# Patient Record
Sex: FEMALE | Race: BLACK OR AFRICAN AMERICAN | NOT HISPANIC OR LATINO
[De-identification: names, ages, dates, MRNs, and addresses within clinical notes are randomized per-mention and may not be internally consistent; named-entity substitution may affect disease eponyms.]

---

## 2018-11-14 ENCOUNTER — APPOINTMENT (OUTPATIENT)
Dept: UROGYNECOLOGY | Facility: CLINIC | Age: 73
End: 2018-11-14
Payer: MEDICARE

## 2018-11-14 VITALS
WEIGHT: 141 LBS | HEIGHT: 64 IN | HEART RATE: 69 BPM | DIASTOLIC BLOOD PRESSURE: 78 MMHG | BODY MASS INDEX: 24.07 KG/M2 | SYSTOLIC BLOOD PRESSURE: 169 MMHG

## 2018-11-14 DIAGNOSIS — Z85.79 PERSONAL HISTORY OF OTHER MALIGNANT NEOPLASMS OF LYMPHOID, HEMATOPOIETIC AND RELATED TISSUES: ICD-10-CM

## 2018-11-14 DIAGNOSIS — Z80.3 FAMILY HISTORY OF MALIGNANT NEOPLASM OF BREAST: ICD-10-CM

## 2018-11-14 DIAGNOSIS — Z82.49 FAMILY HISTORY OF ISCHEMIC HEART DISEASE AND OTHER DISEASES OF THE CIRCULATORY SYSTEM: ICD-10-CM

## 2018-11-14 DIAGNOSIS — Z83.511 FAMILY HISTORY OF GLAUCOMA: ICD-10-CM

## 2018-11-14 DIAGNOSIS — Z83.3 FAMILY HISTORY OF DIABETES MELLITUS: ICD-10-CM

## 2018-11-14 DIAGNOSIS — Z78.9 OTHER SPECIFIED HEALTH STATUS: ICD-10-CM

## 2018-11-14 DIAGNOSIS — Z63.4 DISAPPEARANCE AND DEATH OF FAMILY MEMBER: ICD-10-CM

## 2018-11-14 DIAGNOSIS — T66.XXXA RADIATION SICKNESS, UNSPECIFIED, INITIAL ENCOUNTER: ICD-10-CM

## 2018-11-14 DIAGNOSIS — R01.1 CARDIAC MURMUR, UNSPECIFIED: ICD-10-CM

## 2018-11-14 DIAGNOSIS — R73.03 PREDIABETES.: ICD-10-CM

## 2018-11-14 LAB
BILIRUB UR QL STRIP: NORMAL
CLARITY UR: CLEAR
COLLECTION METHOD: NORMAL
GLUCOSE UR-MCNC: NORMAL
HCG UR QL: 0.2 EU/DL
HGB UR QL STRIP.AUTO: NORMAL
KETONES UR-MCNC: NORMAL
LEUKOCYTE ESTERASE UR QL STRIP: NORMAL
NITRITE UR QL STRIP: NORMAL
PH UR STRIP: 7
PROT UR STRIP-MCNC: NORMAL
SP GR UR STRIP: 1.02

## 2018-11-14 PROCEDURE — 51741 ELECTRO-UROFLOWMETRY FIRST: CPT

## 2018-11-14 PROCEDURE — 51725 SIMPLE CYSTOMETROGRAM: CPT

## 2018-11-14 PROCEDURE — 81003 URINALYSIS AUTO W/O SCOPE: CPT | Mod: QW

## 2018-11-14 PROCEDURE — 99214 OFFICE O/P EST MOD 30 MIN: CPT | Mod: 25

## 2018-11-14 SDOH — SOCIAL STABILITY - SOCIAL INSECURITY: DISSAPEARANCE AND DEATH OF FAMILY MEMBER: Z63.4

## 2018-12-03 ENCOUNTER — OUTPATIENT (OUTPATIENT)
Dept: OUTPATIENT SERVICES | Facility: HOSPITAL | Age: 73
LOS: 1 days | End: 2018-12-03
Payer: COMMERCIAL

## 2018-12-03 ENCOUNTER — APPOINTMENT (OUTPATIENT)
Dept: UROGYNECOLOGY | Facility: CLINIC | Age: 73
End: 2018-12-03
Payer: MEDICARE

## 2018-12-03 DIAGNOSIS — Z01.818 ENCOUNTER FOR OTHER PREPROCEDURAL EXAMINATION: ICD-10-CM

## 2018-12-03 PROCEDURE — 57160 INSERT PESSARY/OTHER DEVICE: CPT

## 2018-12-04 DIAGNOSIS — N81.10 CYSTOCELE, UNSPECIFIED: ICD-10-CM

## 2018-12-11 ENCOUNTER — APPOINTMENT (OUTPATIENT)
Dept: UROGYNECOLOGY | Facility: CLINIC | Age: 73
End: 2018-12-11
Payer: MEDICARE

## 2018-12-11 PROCEDURE — 99214 OFFICE O/P EST MOD 30 MIN: CPT

## 2018-12-12 ENCOUNTER — APPOINTMENT (OUTPATIENT)
Dept: UROGYNECOLOGY | Facility: CLINIC | Age: 73
End: 2018-12-12
Payer: MEDICARE

## 2018-12-12 DIAGNOSIS — N81.10 CYSTOCELE, UNSPECIFIED: ICD-10-CM

## 2018-12-12 PROCEDURE — 99214 OFFICE O/P EST MOD 30 MIN: CPT

## 2018-12-17 ENCOUNTER — APPOINTMENT (OUTPATIENT)
Dept: UROGYNECOLOGY | Facility: CLINIC | Age: 73
End: 2018-12-17

## 2018-12-18 ENCOUNTER — APPOINTMENT (OUTPATIENT)
Dept: UROGYNECOLOGY | Facility: CLINIC | Age: 73
End: 2018-12-18
Payer: MEDICARE

## 2018-12-18 ENCOUNTER — APPOINTMENT (OUTPATIENT)
Dept: UROGYNECOLOGY | Facility: CLINIC | Age: 73
End: 2018-12-18

## 2018-12-18 PROCEDURE — 99214 OFFICE O/P EST MOD 30 MIN: CPT

## 2019-01-02 ENCOUNTER — APPOINTMENT (OUTPATIENT)
Dept: UROGYNECOLOGY | Facility: CLINIC | Age: 74
End: 2019-01-02

## 2019-07-31 ENCOUNTER — APPOINTMENT (OUTPATIENT)
Dept: UROGYNECOLOGY | Facility: CLINIC | Age: 74
End: 2019-07-31

## 2019-07-31 DIAGNOSIS — Z87.440 PERSONAL HISTORY OF URINARY (TRACT) INFECTIONS: ICD-10-CM

## 2019-08-07 ENCOUNTER — APPOINTMENT (OUTPATIENT)
Dept: UROGYNECOLOGY | Facility: CLINIC | Age: 74
End: 2019-08-07
Payer: MEDICARE

## 2019-08-07 PROCEDURE — 99215 OFFICE O/P EST HI 40 MIN: CPT | Mod: 25

## 2019-08-07 PROCEDURE — 51798 US URINE CAPACITY MEASURE: CPT

## 2019-08-07 RX ORDER — ESTRADIOL 0.1 MG/G
0.1 CREAM VAGINAL
Qty: 1 | Refills: 3 | Status: ACTIVE | COMMUNITY
Start: 2019-08-07 | End: 1900-01-01

## 2019-08-07 NOTE — HISTORY OF PRESENT ILLNESS
[FreeTextEntry1] : The pt is a 73 y/o with vaginal prolapse who was seen by Dr. Warren in 2018.  Pessaries, ring with support and cube were tried and failed.  She declined surgery at that time.\par She has been to pelvic floor PT x 6 and she reports minimal improvement.\par She denies JAY or urgency UI.\par She underwent pelvic u/s in 7/2019: neg\par She reports that her prolapse is somewhat bothersome, 5/10 from 8/10.\par Her last intercourse was >10 yrs ago, lack of partner.\par She is not interested in having intercourse in the future

## 2019-08-07 NOTE — ASSESSMENT
[FreeTextEntry1] : Today's findings were discussed with the pt and written pamphlets were provided for vaginal prolapse.  She was offered various tx options including pessary or surgery.  She would like to consider pessary.\par She will be prescribed with estrogen cream.\par

## 2019-08-07 NOTE — PHYSICAL EXAM
[No Acute Distress] : in no acute distress [Well developed] : well developed [Well Nourished] : ~L well nourished [Good Hygeine] : demonstrates good hygeine [Normal Mood/Affect] : mood and affect are normal [Normal Memory] : ~T memory was ~L unimpaired [Oriented x3] : oriented to person, place, and time [Normal Lung Sounds] : the lungs were clear to auscultation [Respirations regular] : ~T respiratory rate was regular [Rate & Rhythm Regular] : ~T heart rate and rhythm were normal [No Edema] : ~T edema was not present [Supple] : ~T the neck demonstrated no ~M decrease in suppleness [Symmetrical] : the neck was ~L symmetrical [Thyroid Normal] : the thyroid ~T showed no abnormalities [Normal Gait] : gait was normal [Labia Majora] : were normal [Labia Minora] : were normal [Bartholin's Gland] : both Bartholin's glands were normal  [Normal Appearance] : general appearance was normal [Atrophy] : atrophy [No Bleeding] : there was no active vaginal bleeding [1] : 1 [Aa ____] : Aa [unfilled] [Ba ____] : Ba [unfilled] [C ____] : C [unfilled] [GH ____] : GH [unfilled] [TVL ____] : TVL  [unfilled] [PB ____] : PB [unfilled] [Ap ____] : Ap [unfilled] [D ____] : D [unfilled] [Bp ____] : Bp [unfilled] [] : I [Post Void Residual ____ml] : post void residual via catheterization was [unfilled] ml [Normal] : normal [Exam Deferred] : was deferred [Anxiety] : patient is not anxious [Dyspnea] : no dyspnea [Cough] : no cough [Murmurs] : no murmurs were heard [Varicose Veins] : no varicose veins observed [Tracheal Deviation] : no tracheal deviation observed [Mass] : no ~M [unfilled] neck mass was observed [Mass (___ Cm)] : no ~M [unfilled] abdominal mass was palpated [Tenderness] : ~T no ~M abdominal tenderness observed [Distended] : not distended [Hernia] : no hernia observed [H/Smegaly] : no hepatosplenomegaly [Estrogen Effect] : no estrogen effect was observed

## 2019-08-09 ENCOUNTER — RESULT REVIEW (OUTPATIENT)
Age: 74
End: 2019-08-09

## 2019-08-09 LAB — BACTERIA UR CULT: NORMAL

## 2019-09-11 ENCOUNTER — APPOINTMENT (OUTPATIENT)
Dept: UROGYNECOLOGY | Facility: CLINIC | Age: 74
End: 2019-09-11

## 2019-10-23 ENCOUNTER — APPOINTMENT (OUTPATIENT)
Dept: OBGYN | Facility: CLINIC | Age: 74
End: 2019-10-23
Payer: MEDICARE

## 2019-10-23 VITALS
RESPIRATION RATE: 68 BRPM | DIASTOLIC BLOOD PRESSURE: 67 MMHG | OXYGEN SATURATION: 98 % | HEIGHT: 64 IN | TEMPERATURE: 97.5 F | SYSTOLIC BLOOD PRESSURE: 131 MMHG | BODY MASS INDEX: 23.56 KG/M2 | WEIGHT: 138 LBS

## 2019-10-23 PROCEDURE — 99387 INIT PM E/M NEW PAT 65+ YRS: CPT

## 2019-10-23 NOTE — PHYSICAL EXAM
[Awake] : awake [Alert] : alert [Acute Distress] : no acute distress [LAD] : no lymphadenopathy [Thyroid Nodule] : no thyroid nodule [Goiter] : no goiter [Mass] : no breast mass [Nipple Discharge] : no nipple discharge [Axillary LAD] : no axillary lymphadenopathy [Soft] : soft [Tender] : non tender [Oriented x3] : oriented to person, place, and time [Normal] : uterus [Atrophy] : atrophy [Erythema] : no erythema [Cystocele] : a cystocele [Tenderness] : no tenderness [Dry Mucosa] : dry mucosa [No Bleeding] : there was no active vaginal bleeding [Uterine Adnexae] : were not tender and not enlarged [No Tenderness] : no rectal tenderness [Nl Sphincter Tone] : normal sphincter tone

## 2020-12-21 PROBLEM — Z87.440 HISTORY OF URINARY TRACT INFECTION: Status: RESOLVED | Noted: 2019-07-26 | Resolved: 2020-12-21

## 2021-04-13 ENCOUNTER — APPOINTMENT (OUTPATIENT)
Dept: UROGYNECOLOGY | Facility: CLINIC | Age: 76
End: 2021-04-13
Payer: MEDICARE

## 2021-04-13 VITALS
WEIGHT: 137 LBS | TEMPERATURE: 97 F | BODY MASS INDEX: 23.39 KG/M2 | DIASTOLIC BLOOD PRESSURE: 80 MMHG | SYSTOLIC BLOOD PRESSURE: 126 MMHG | HEIGHT: 64 IN

## 2021-04-13 PROCEDURE — A4562: CPT

## 2021-04-13 PROCEDURE — 99072 ADDL SUPL MATRL&STAF TM PHE: CPT

## 2021-04-13 PROCEDURE — 99214 OFFICE O/P EST MOD 30 MIN: CPT

## 2021-04-13 NOTE — PROCEDURE
[Good Fit] : fits well [Erosion] : no evidence of erosion [Erythema] : no erythema [Discharge] : no vaginal discharge [Infection] : no evidence of infection [Pessary Inserted] : inserted [FreeTextEntry1] : open ring #3

## 2021-04-13 NOTE — DISCUSSION/SUMMARY
[FreeTextEntry1] : \harry Sanchez presents with a stage 2 cystocele and symptoms of overactive bladder with UUI. She reports her overactive bladder and UUI are most bothersome. We reviewed her exam findings and symptoms. I recommend the following: \par -2 week pessary trial to see if prolapse reduction improves urinary symptoms. If no change, will remove pessary and start medication for overactive bladder\par -Start bladder training, IUGA instructions provided\par - Pessary precautions and instructions reviewed \par \par The following treatment plan was designed for this patient and provided to her in written form and reviewed extensively. Patient was given a copy to take home: \par You have bladder prolapse and urgency urinary incontinence. It is unclear whether your prolapse is causing your urinary symptoms. We will do a 2 week pessary trial to see if lifting your bladder improves your urinary symptoms. If there is no change in your urinary symptoms with the pessary, we will remove it and start a medication for overactive bladder.

## 2021-04-13 NOTE — HISTORY OF PRESENT ILLNESS
[Rectal Prolapse] : moderate [Unable To Restrain Bowel Movement] : moderate [Feelings Of Urinary Urgency] : severe [Pain During Urination (Dysuria)] : no [Urinary Tract Infection] : severe [] : years ago [FreeTextEntry1] : \harry Sanchez presents for consultation. She was previously seen by Dr Warren and Dr Alvarez with pelvic organ prolapse. She has undergone treatment with pessaries and pelvic floor exercises. At her last visit with Dr Alvarez in 2019 she was found to have a stage 3 cystocele and stage 2 rectocele. She reports the prolapse has stayed the same however her most bothersome problem is her urinary frequency, urgency and UUI. She recently started a homeopathic treatment called bladder HX which she took in past and improved her symptoms.

## 2021-04-13 NOTE — REASON FOR VISIT
[Follow-Up Visit_____] : a follow-up visit for [unfilled] [Pelvic Organ Prolapse] : pelvic organ prolapse [Pelvic Strengthening] : pelvic strengthening [Pessary] : pessary

## 2021-04-13 NOTE — PHYSICAL EXAM
[Chaperone Present] : A chaperone was present in the examining room during all aspects of the physical examination [FreeTextEntry1] : General: Well, appearing. Alert and orientated. No acute distress\par HEENT: Normocephalic, atraumatic and extraocular muscles appear to be intact \par Neck: Full range of motion, no obvious lymphadenopathy, deformities, or masses noted \par Respiratory: Speaking in full sentences comfortably, normal work of breathing and no cough during visit\par Musculoskeletal: active full range of motion in extremities \par Extremities: No upper extremity edema noted\par Skin: no obvious rash or skin lesions\par Neuro: Orientated X 3, speech is fluent, normal rate\par Psych: Normal mood and affect \par   [Tenderness] : ~T no ~M abdominal tenderness observed [Vulvar Atrophy] : vulvar atrophy [Labia Majora] : were normal [Atrophy] : atrophy [Dry Mucosa] : dry mucosa [Aa ____] : Aa [unfilled] [Ba ____] : Ba [unfilled] [C ____] : C [unfilled] [GH ____] : GH [unfilled] [PB ____] : PB [unfilled] [TVL ____] : TVL  [unfilled] [Ap ____] : Ap [unfilled] [Bp ____] : Bp [unfilled] [D ____] : D [unfilled] [Normal] : no abnormalities [Exam Deferred] : was deferred [de-identified] : adhesed to vaginal wall

## 2021-04-15 ENCOUNTER — NON-APPOINTMENT (OUTPATIENT)
Age: 76
End: 2021-04-15

## 2021-04-21 ENCOUNTER — NON-APPOINTMENT (OUTPATIENT)
Age: 76
End: 2021-04-21

## 2021-05-06 ENCOUNTER — APPOINTMENT (OUTPATIENT)
Dept: UROGYNECOLOGY | Facility: CLINIC | Age: 76
End: 2021-05-06
Payer: MEDICARE

## 2021-05-06 VITALS — SYSTOLIC BLOOD PRESSURE: 138 MMHG | TEMPERATURE: 97 F | HEART RATE: 78 BPM | DIASTOLIC BLOOD PRESSURE: 80 MMHG

## 2021-05-06 DIAGNOSIS — R35.0 FREQUENCY OF MICTURITION: ICD-10-CM

## 2021-05-06 PROCEDURE — 99072 ADDL SUPL MATRL&STAF TM PHE: CPT

## 2021-05-06 PROCEDURE — 99213 OFFICE O/P EST LOW 20 MIN: CPT

## 2021-05-06 NOTE — HISTORY OF PRESENT ILLNESS
[FreeTextEntry1] : Pt w/ known hx POP fitted with OR #3 presents to office today for 2 week pessary check.  Pt is happy with support provided by the pessary and feels it has helped improve some of her urinary urgency and frequency and UUI symptoms.  She still reports some occurrences of UUI and wears a pad.  Feels empty after voids.  Denies any constipation.  Pt applying topical A&D ointment with relief of vulvar irritation symptoms. Pt also performing PFE's daily without difficulties. Pt con't taking OTC Herbal Bladder medication.

## 2021-05-06 NOTE — PHYSICAL EXAM
[No Acute Distress] : in no acute distress [Well developed] : well developed [Well Nourished] : ~L well nourished [Good Hygeine] : demonstrates good hygeine [Oriented x3] : oriented to person, place, and time [Normal Memory] : ~T memory was ~L unimpaired [Normal Mood/Affect] : mood and affect are normal [Anxiety] : patient is not anxious [Tenderness] : ~T no ~M abdominal tenderness observed [Distended] : not distended [Warm and Dry] : was warm and dry to touch [Normal Gait] : gait was normal [Vulvar Atrophy] : vulvar atrophy [Labia Majora] : were normal [Labia Minora] : were normal [Atrophy] : atrophy [Cystocele] : a cystocele [Uterine Prolapse] : uterine prolapse [Scant] : there was scant vaginal bleeding [de-identified] : no visible prolapse or pessary bulging at introitus [FreeTextEntry4] : pessary intact in posterior vault

## 2021-05-06 NOTE — PROCEDURE
[Good Fit] : fits well [Refit] : refit is not needed [Erosion] : no evidence of erosion [Infection] : no evidence of infection [Pessary Inserted] : inserted [Pessary Washed] : washed [H2O] : H2O [Mild] : mild bleeding [Medication Review] : Medicaiton Review: Patient verbalizes understanding of risks and benefits [Fluid Management] : Fluid Management: patient verbalizes understanding 6-10 cups per day [Bowel Management] : Bowel Management: patient verbalizes understanding of daily dietary fiber intake [Bladder Training] : Bladder Training: Patient given information with verbal understanding [FreeTextEntry1] : OR #3 [de-identified] : at posterior wall w/ removal of pessary and during speculum exam using scopette, spontaneously resolved [FreeTextEntry3] : . [FreeTextEntry8] : Con't daily proper pericare

## 2021-05-06 NOTE — DISCUSSION/SUMMARY
[FreeTextEntry1] : x12 weeks POP and pessary check or sooner PRN-doing well with OR #3 pessary\par Con't topical A&D or Aquaphor ointment daily frequently as barrier protection\par Advised avoid constipation/straining w/ daily fiber/fluid intake and stool softeners daily PRN\par Con't daily PFE's as instructed\par Con't daily proper pericare\par Instructed pt to call the office if any problems or concerns and she verbalized understanding.\par \par

## 2021-05-13 ENCOUNTER — NON-APPOINTMENT (OUTPATIENT)
Age: 76
End: 2021-05-13

## 2021-05-17 ENCOUNTER — APPOINTMENT (OUTPATIENT)
Dept: UROGYNECOLOGY | Facility: CLINIC | Age: 76
End: 2021-05-17
Payer: MEDICARE

## 2021-05-17 VITALS
DIASTOLIC BLOOD PRESSURE: 73 MMHG | SYSTOLIC BLOOD PRESSURE: 145 MMHG | OXYGEN SATURATION: 98 % | HEART RATE: 64 BPM | TEMPERATURE: 97.1 F

## 2021-05-17 PROCEDURE — 99213 OFFICE O/P EST LOW 20 MIN: CPT

## 2021-05-17 PROCEDURE — 99072 ADDL SUPL MATRL&STAF TM PHE: CPT

## 2021-05-17 NOTE — PHYSICAL EXAM
[No Acute Distress] : in no acute distress [Well Nourished] : ~L well nourished [Well developed] : well developed [Good Hygeine] : demonstrates good hygeine [Oriented x3] : oriented to person, place, and time [Normal Memory] : ~T memory was ~L unimpaired [Normal Mood/Affect] : mood and affect are normal [Warm and Dry] : was warm and dry to touch [Normal Gait] : gait was normal [Vulvar Atrophy] : vulvar atrophy [Labia Majora] : were normal [Labia Minora] : were normal [Atrophy] : atrophy [Cystocele] : a cystocele [Uterine Prolapse] : uterine prolapse [No Bleeding] : there was no active vaginal bleeding [Anxiety] : patient is not anxious [Tenderness] : ~T no ~M abdominal tenderness observed [Distended] : not distended [de-identified] : no visible prolapse or pessary bulging at introitus [FreeTextEntry4] : pessary intact in posterior vault

## 2021-05-17 NOTE — DISCUSSION/SUMMARY
[FreeTextEntry1] : 1. POP\par - Refit with pessary OR #4\par - pessary precautions and instructions reviewed with patient\par - Advised may use A&D ointment as barrier cream \par - Will RTO in 3 months for pessary follow up or sooner if needed\par \par Patient agrees to call office with any questions or concerns, verbalized understanding. \par \par

## 2021-05-17 NOTE — PROCEDURE
[Pessary Inserted] : inserted [Pessary Washed] : washed [H2O] : H2O [Medication Review] : Medicaiton Review: Patient verbalizes understanding of risks and benefits [Bowel Management] : Bowel Management: patient verbalizes understanding of daily dietary fiber intake [Refit] : refit needed [None] : no bleeding [Good Fit] : fit is not good [Erosion] : no evidence of erosion [Erythema] : no erythema [Discharge] : no vaginal discharge [Infection] : no evidence of infection [FreeTextEntry1] : OR #3 [de-identified] : refit with OR #4, patient reports it is comfortable, remained intact with standing, walking, Valsalva and using toilet. [FreeTextEntry3] : May use A&D ointment as barrier cream [FreeTextEntry8] : Con't daily proper pericare

## 2021-05-17 NOTE — HISTORY OF PRESENT ILLNESS
[FreeTextEntry1] : Patient with known hx of POP managed with OR#3 presents to office with complaints of feeling like pessary is falling down. States she feels the pessary is providing support but she feels the pessary at the vaginal opening. Denies any pelvic pain, pressure or vaginal bleeding. denies any urinary or bowel complaints. she states she is not interested in learning pessary self-care. She does not want to use vaginal estrogen.

## 2021-08-11 ENCOUNTER — APPOINTMENT (OUTPATIENT)
Dept: UROGYNECOLOGY | Facility: CLINIC | Age: 76
End: 2021-08-11
Payer: MEDICARE

## 2021-08-11 PROCEDURE — 99213 OFFICE O/P EST LOW 20 MIN: CPT

## 2021-08-12 NOTE — DISCUSSION/SUMMARY
[FreeTextEntry1] : 1. Pelvic prolapse:\par - Continued with Open Ring #4\par - pessary precautions and instructions reviewed with patient\par - Advised may use A&D ointment as barrier cream \par \par Will RTO in 3 months for pessary follow up or sooner if needed\par Patient agrees to call office with any questions or concerns, verbalized understanding. \par \par

## 2021-08-12 NOTE — PHYSICAL EXAM
[No Acute Distress] : in no acute distress [Well developed] : well developed [Well Nourished] : ~L well nourished [Good Hygeine] : demonstrates good hygeine [Oriented x3] : oriented to person, place, and time [Normal Memory] : ~T memory was ~L unimpaired [Normal Mood/Affect] : mood and affect are normal [Warm and Dry] : was warm and dry to touch [Normal Gait] : gait was normal [Vulvar Atrophy] : vulvar atrophy [Labia Majora] : were normal [Labia Minora] : were normal [Atrophy] : atrophy [Cystocele] : a cystocele [Uterine Prolapse] : uterine prolapse [No Bleeding] : there was no active vaginal bleeding [Anxiety] : patient is not anxious [Tenderness] : ~T no ~M abdominal tenderness observed [Distended] : not distended

## 2021-08-12 NOTE — PROCEDURE
[Good Fit] : fits well [Pessary Inserted] : inserted [Pessary Washed] : washed [H2O] : H2O [None] : no bleeding [Medication Review] : Medicaiton Review: Patient verbalizes understanding of risks and benefits [Bowel Management] : Bowel Management: patient verbalizes understanding of daily dietary fiber intake [Erosion] : no evidence of erosion [Erythema] : no erythema [Discharge] : no vaginal discharge [Infection] : no evidence of infection [FreeTextEntry1] : Open Ring # 4 [FreeTextEntry3] : May use A&D ointment as barrier cream [FreeTextEntry8] : Con't daily proper pericare

## 2021-08-12 NOTE — HISTORY OF PRESENT ILLNESS
[FreeTextEntry1] : Laura, 75y/o presents to the office for follow up pelvic prolapse.  PT is supported with Open Ring # 4 pessary is providing support.  Denies any pelvic pain, pressure or vaginal bleeding.  Denies any urinary or bowel complaints. She does not want to use vaginal estrogen.

## 2021-10-07 ENCOUNTER — APPOINTMENT (OUTPATIENT)
Dept: UROGYNECOLOGY | Facility: CLINIC | Age: 76
End: 2021-10-07
Payer: MEDICARE

## 2021-10-07 PROCEDURE — 99213 OFFICE O/P EST LOW 20 MIN: CPT

## 2021-10-07 NOTE — PHYSICAL EXAM
[No Acute Distress] : in no acute distress [Well developed] : well developed [Well Nourished] : ~L well nourished [Good Hygeine] : demonstrates good hygeine [Oriented x3] : oriented to person, place, and time [Normal Memory] : ~T memory was ~L unimpaired [Normal Mood/Affect] : mood and affect are normal [Anxiety] : patient is not anxious [Tenderness] : ~T no ~M abdominal tenderness observed [Distended] : not distended [Warm and Dry] : was warm and dry to touch [Normal Gait] : gait was normal [Vulvar Atrophy] : vulvar atrophy [Labia Majora] : were normal [Labia Minora] : were normal [Atrophy] : atrophy [Cystocele] : a cystocele [Uterine Prolapse] : uterine prolapse [No Bleeding] : there was no active vaginal bleeding

## 2021-10-07 NOTE — PROCEDURE
[Good Fit] : fits well [Erosion] : no evidence of erosion [Erythema] : no erythema [Discharge] : no vaginal discharge [Infection] : no evidence of infection [Pessary Inserted] : inserted [Pessary Washed] : washed [H2O] : H2O [None] : no bleeding [Medication Review] : Medicaiton Review: Patient verbalizes understanding of risks and benefits [Bowel Management] : Bowel Management: patient verbalizes understanding of daily dietary fiber intake [FreeTextEntry1] : Open Ring # 4 [FreeTextEntry3] : May use A&D ointment as barrier cream [FreeTextEntry8] : Con't daily proper pericare

## 2021-10-07 NOTE — HISTORY OF PRESENT ILLNESS
[FreeTextEntry1] : Laura, 77y/o presents to the office for follow up pelvic prolapse.  PT is supported with Open Ring # 4 pessary is providing support.  Denies any pelvic pain, pressure or vaginal bleeding.  Denies any urinary or bowel complaints. She does not want to use vaginal estrogen.

## 2021-12-01 ENCOUNTER — APPOINTMENT (OUTPATIENT)
Dept: UROGYNECOLOGY | Facility: CLINIC | Age: 76
End: 2021-12-01
Payer: MEDICARE

## 2021-12-01 PROCEDURE — 99213 OFFICE O/P EST LOW 20 MIN: CPT

## 2021-12-06 NOTE — HISTORY OF PRESENT ILLNESS
[FreeTextEntry1] : Laura, 77y/o presents to the office for follow up pelvic prolapse.  PT is supported with Open Ring # 4 pessary is providing support.  Denies any pelvic pain, pressure or vaginal bleeding.  Urinating with some  urinary incontinence.  No bowel complaints.  She is trying PFE daily but feels she is not doing well with that.

## 2021-12-06 NOTE — DISCUSSION/SUMMARY
[FreeTextEntry1] : 1. Pelvic prolapse:\par - Continued with Open Ring #4\par - pessary precautions and instructions reviewed with patient\par - Advised may use A&D ointment as barrier cream.\par -Continue PFE daily and instructions were discussed with her.\par \par Will RTO in 3 months for pessary follow up or sooner if needed\par Patient agrees to call office with any questions or concerns, verbalized understanding. \par \par

## 2022-02-07 ENCOUNTER — APPOINTMENT (OUTPATIENT)
Dept: UROGYNECOLOGY | Facility: CLINIC | Age: 77
End: 2022-02-07
Payer: MEDICARE

## 2022-02-07 PROCEDURE — 99213 OFFICE O/P EST LOW 20 MIN: CPT

## 2022-02-07 NOTE — HISTORY OF PRESENT ILLNESS
[FreeTextEntry1] : Laura, 77y/o presents to the office for follow up pelvic prolapse.  PT is supported with Open Ring # 4 pessary is providing support.  Denies any pelvic pain, pressure or vaginal bleeding.  Urinating with some  urinary incontinence.  No bowel complaints.  She continues with PFE daily.

## 2022-03-04 ENCOUNTER — NON-APPOINTMENT (OUTPATIENT)
Age: 77
End: 2022-03-04

## 2022-03-11 ENCOUNTER — APPOINTMENT (OUTPATIENT)
Dept: OBGYN | Facility: CLINIC | Age: 77
End: 2022-03-11
Payer: MEDICARE

## 2022-03-11 VITALS — WEIGHT: 127 LBS | SYSTOLIC BLOOD PRESSURE: 112 MMHG | DIASTOLIC BLOOD PRESSURE: 60 MMHG | BODY MASS INDEX: 21.8 KG/M2

## 2022-03-11 DIAGNOSIS — R10.30 LOWER ABDOMINAL PAIN, UNSPECIFIED: ICD-10-CM

## 2022-03-11 DIAGNOSIS — Z00.00 ENCOUNTER FOR GENERAL ADULT MEDICAL EXAMINATION W/OUT ABNORMAL FINDINGS: ICD-10-CM

## 2022-03-11 PROCEDURE — 99204 OFFICE O/P NEW MOD 45 MIN: CPT

## 2022-03-11 NOTE — HISTORY OF PRESENT ILLNESS
[Patient reported mammogram was normal] : Patient reported mammogram was normal [Patient reported PAP Smear was normal] : Patient reported PAP Smear was normal [Patient reported colonoscopy was normal] : Patient reported colonoscopy was normal [FreeTextEntry1] : 76YO  LMP 1979 sees Dr Helms for uterine prolapse with pessary in place, presents for checkup noting LAP extending bilat, drank cranberry juice with min improvement. [Mammogramdate] : 9/21 [PapSmeardate] : 2019 [BoneDensityDate] : many yrs ago [ColonoscopyDate] : 5-10 yrs

## 2022-03-11 NOTE — PHYSICAL EXAM
[Chaperone Present] : A chaperone was present in the examining room during all aspects of the physical examination [Appropriately responsive] : appropriately responsive [Alert] : alert [No Acute Distress] : no acute distress [No Lymphadenopathy] : no lymphadenopathy [Regular Rate Rhythm] : regular rate rhythm [No Murmurs] : no murmurs [Clear to Auscultation B/L] : clear to auscultation bilaterally [Soft] : soft [Non-tender] : non-tender [Non-distended] : non-distended [No HSM] : No HSM [No Lesions] : no lesions [No Mass] : no mass [Oriented x3] : oriented x3 [Examination Of The Breasts] : a normal appearance [No Masses] : no breast masses were palpable [Labia Majora] : normal [Labia Minora] : normal [Normal] : normal [Tenderness] : nontender [Uterine Adnexae] : normal [FreeTextEntry6] : Cannot assess size of uterus or right adnexa (sight of discomfort)

## 2022-03-21 LAB
BACTERIA GENITAL AEROBE CULT: NORMAL
BACTERIA UR CULT: NORMAL
CANDIDA VAG CYTO: NOT DETECTED
CYTOLOGY CVX/VAG DOC THIN PREP: ABNORMAL
G VAGINALIS+PREV SP MTYP VAG QL MICRO: NOT DETECTED
T VAGINALIS VAG QL WET PREP: NOT DETECTED

## 2022-04-01 ENCOUNTER — APPOINTMENT (OUTPATIENT)
Dept: OBGYN | Facility: CLINIC | Age: 77
End: 2022-04-01

## 2022-04-06 ENCOUNTER — APPOINTMENT (OUTPATIENT)
Dept: UROGYNECOLOGY | Facility: CLINIC | Age: 77
End: 2022-04-06
Payer: MEDICARE

## 2022-04-06 VITALS — DIASTOLIC BLOOD PRESSURE: 78 MMHG | TEMPERATURE: 97.3 F | SYSTOLIC BLOOD PRESSURE: 135 MMHG | HEART RATE: 74 BPM

## 2022-04-06 PROCEDURE — 99213 OFFICE O/P EST LOW 20 MIN: CPT

## 2022-04-07 NOTE — HISTORY OF PRESENT ILLNESS
[FreeTextEntry1] : Laura, 75y/o presents to the office for follow up pelvic prolapse.  PT is supported with Open Ring # 4 pessary is providing support.  Denies any pelvic pain, pressure or vaginal bleeding.  Urinating with some  urinary incontinence.  No bowel complaints.

## 2022-04-07 NOTE — DISCUSSION/SUMMARY
[FreeTextEntry1] : 1. Pelvic prolapse:\par - Continued with Open Ring #4\par - pessary precautions and instructions reviewed with patient\par -May use Luvena and Replens.\par - Advised may use A&D ointment as barrier cream.\par -Continue PFE daily and instructions were discussed with her.\par \par She will come back to the office (GYN) to get a transvaginal US as per Dr. Sherman.\par \par Will RTO in 3 months for pessary follow up or sooner if needed\par Patient agrees to call office with any questions or concerns, verbalized understanding. \par \par

## 2022-06-10 ENCOUNTER — APPOINTMENT (OUTPATIENT)
Dept: OBGYN | Facility: CLINIC | Age: 77
End: 2022-06-10

## 2022-07-06 ENCOUNTER — APPOINTMENT (OUTPATIENT)
Dept: UROGYNECOLOGY | Facility: CLINIC | Age: 77
End: 2022-07-06

## 2022-07-06 PROCEDURE — 99213 OFFICE O/P EST LOW 20 MIN: CPT

## 2022-07-07 NOTE — HISTORY OF PRESENT ILLNESS
[FreeTextEntry1] : Laura, 77y/o presents to the office for follow up pelvic prolapse.  PT is supported with Open Ring # 4 pessary is providing support.  Denies any pelvic pain, pressure or vaginal bleeding.  Urinating with some  urinary incontinence.  No bowel complaints.

## 2022-07-07 NOTE — DISCUSSION/SUMMARY
[FreeTextEntry1] : 1. Pelvic prolapse:\par - Continued with Open Ring #4\par - pessary precautions and instructions reviewed with patient\par -May use Luvena and Replens.\par - Advised may use A&D ointment as barrier cream.\par -Continue PFE daily and instructions were discussed with her.\par \par Will RTO in 3 months for pessary follow up or sooner if needed\par Patient agrees to call office with any questions or concerns, verbalized understanding. \par \par

## 2022-10-19 ENCOUNTER — APPOINTMENT (OUTPATIENT)
Dept: UROGYNECOLOGY | Facility: CLINIC | Age: 77
End: 2022-10-19

## 2022-10-19 VITALS — TEMPERATURE: 96.8 F

## 2022-10-19 PROCEDURE — 99213 OFFICE O/P EST LOW 20 MIN: CPT

## 2022-10-20 NOTE — DISCUSSION/SUMMARY
[FreeTextEntry1] : 1. Pelvic prolapse:\par - Continued with Open Ring #4\par - pessary precautions and instructions reviewed with patient.\par \par Vaginal atrophy:\par -May use Luvena and Replens.\par - Advised may use A&D ointment as barrier cream.\par -Continue PFE daily and instructions were reviewed.\par \par Will RTO in 3 months for pessary follow up or sooner if needed\par Patient agrees to call office with any questions or concerns, verbalized understanding. \par \par

## 2023-01-11 ENCOUNTER — APPOINTMENT (OUTPATIENT)
Dept: UROGYNECOLOGY | Facility: CLINIC | Age: 78
End: 2023-01-11
Payer: MEDICARE

## 2023-01-11 VITALS
OXYGEN SATURATION: 99 % | SYSTOLIC BLOOD PRESSURE: 149 MMHG | BODY MASS INDEX: 22.2 KG/M2 | DIASTOLIC BLOOD PRESSURE: 75 MMHG | HEART RATE: 69 BPM | HEIGHT: 64 IN | WEIGHT: 130 LBS

## 2023-01-11 PROCEDURE — 99213 OFFICE O/P EST LOW 20 MIN: CPT

## 2023-01-18 NOTE — HISTORY OF PRESENT ILLNESS
[FreeTextEntry1] : Laura, 78y/o presents to the office for follow up pelvic prolapse.  PT is supported with Open Ring # 4 pessary is providing support.  Denies any pelvic pain, pressure or vaginal bleeding.  Urinating with some  urinary incontinence.  No bowel complaints.  Using vaginal lubrication.  Noticed some leakage but unsure if it's urine.  She feels wet as she stands at times.  Possible vaginal discharge.  Dallin any staining.

## 2023-01-18 NOTE — DISCUSSION/SUMMARY
[FreeTextEntry1] : 1. Pelvic prolapse:\par - Continued with Open Ring #4\par - pessary precautions and instructions reviewed with patient.\par \par Vaginal atrophy:\par -May continue use Luvena and Replens.\par - Advised may use A&D ointment as barrier cream.\par -Continue PFE daily and instructions were reviewed.\par \par Will RTO in 3 months for pessary follow up or sooner if needed\par Patient agrees to call office with any questions or concerns, verbalized understanding. \par \par

## 2023-05-12 ENCOUNTER — APPOINTMENT (OUTPATIENT)
Dept: UROGYNECOLOGY | Facility: CLINIC | Age: 78
End: 2023-05-12
Payer: MEDICARE

## 2023-05-12 VITALS
SYSTOLIC BLOOD PRESSURE: 160 MMHG | HEART RATE: 71 BPM | HEIGHT: 63 IN | DIASTOLIC BLOOD PRESSURE: 84 MMHG | BODY MASS INDEX: 22.32 KG/M2 | WEIGHT: 126 LBS | OXYGEN SATURATION: 97 %

## 2023-05-12 PROCEDURE — 99214 OFFICE O/P EST MOD 30 MIN: CPT

## 2023-05-16 NOTE — DISCUSSION/SUMMARY
[FreeTextEntry1] : Pelvic prolapse:\par - Continued with Open Ring #4\par - pessary precautions and instructions reviewed with patient.\par -Pt will do pessary self care.\par \par Vaginal atrophy:\par -May continue use Luvena and Replens.\par - Advised may use A&D ointment as barrier cream.\par \par \par Will RTO in 3 months for pessary follow up or sooner if needed\par If pt have any problems/concern to call office.  PT verbalizes understanding and agrees.\par \par

## 2023-05-16 NOTE — HISTORY OF PRESENT ILLNESS
[FreeTextEntry1] : Laura, 79y/o presents to the office for follow up pelvic prolapse.  PT is supported with Open Ring # 4 pessary is providing support.  Denies any pelvic pain, pressure or vaginal bleeding.  Urinating with some  urinary incontinence.  No bowel complaints.  Using vaginal lubrication.

## 2023-05-16 NOTE — PROCEDURE
[Good Fit] : fits well [Pessary Inserted] : inserted [Pessary Washed] : washed [H2O] : H2O [None] : no bleeding [Medication Review] : Medicaiton Review: Patient verbalizes understanding of risks and benefits [Bowel Management] : Bowel Management: patient verbalizes understanding of daily dietary fiber intake [Erosion] : no evidence of erosion [Erythema] : no erythema [Discharge] : no vaginal discharge [Infection] : no evidence of infection [FreeTextEntry1] : Open Ring # 4 [FreeTextEntry3] : May use A&D ointment as barrier cream.  May use Luvena or Replens  [FreeTextEntry8] : Reinforced daily pericare.  PT able to do pessary self care.

## 2023-07-24 ENCOUNTER — APPOINTMENT (OUTPATIENT)
Dept: OBGYN | Facility: CLINIC | Age: 78
End: 2023-07-24

## 2023-08-11 ENCOUNTER — APPOINTMENT (OUTPATIENT)
Dept: UROGYNECOLOGY | Facility: CLINIC | Age: 78
End: 2023-08-11
Payer: SELF-PAY

## 2023-08-11 VITALS
SYSTOLIC BLOOD PRESSURE: 149 MMHG | DIASTOLIC BLOOD PRESSURE: 69 MMHG | HEIGHT: 64 IN | OXYGEN SATURATION: 98 % | HEART RATE: 78 BPM

## 2023-08-11 PROCEDURE — 99213 OFFICE O/P EST LOW 20 MIN: CPT

## 2023-08-13 NOTE — PROCEDURE
[Good Fit] : fits well [Pessary Inserted] : inserted [Pessary Washed] : washed [H2O] : H2O [None] : no bleeding [Medication Review] : Medicaiton Review: Patient verbalizes understanding of risks and benefits [Bowel Management] : Bowel Management: patient verbalizes understanding of daily dietary fiber intake [Erosion] : no evidence of erosion [Erythema] : no erythema [Discharge] : no vaginal discharge [Infection] : no evidence of infection [FreeTextEntry3] : May use A&D ointment as barrier cream.  May use Luvena or Replens  [FreeTextEntry1] : Open Ring # 4 [FreeTextEntry8] : Reinforced daily pericare.  PT able to do pessary self care.

## 2023-08-13 NOTE — DISCUSSION/SUMMARY
[FreeTextEntry1] : Pelvic prolapse: - Continued with Open Ring #4 -Precautions and instructions reviewed. -Pt will do pessary self care.  Vaginal atrophy: -May continue use Luvena and Replens. - Advised may use A&D ointment as barrier cream.  Will RTO in 3 months for pessary follow up or sooner if needed If pt have any problems/concern to call office.  PT verbalizes understanding and agrees.

## 2023-08-13 NOTE — HISTORY OF PRESENT ILLNESS
[FreeTextEntry1] : Laura, 77y/o presents to the office for follow up pelvic prolapse, supported with Open Ring # 4..  Denies any pelvic pain, pressure or vaginal bleeding.  Urinating with some urinary incontinence.  No bowel complaints.  Using vaginal lubrication.

## 2023-08-13 NOTE — PHYSICAL EXAM
[No Acute Distress] : in no acute distress [Well developed] : well developed [Well Nourished] : ~L well nourished [Good Hygeine] : demonstrates good hygeine [Oriented x3] : oriented to person, place, and time [Normal Memory] : ~T memory was ~L unimpaired [Normal Mood/Affect] : mood and affect are normal [Warm and Dry] : was warm and dry to touch [Normal Gait] : gait was normal [Vulvar Atrophy] : vulvar atrophy [Labia Majora] : were normal [Labia Minora] : were normal [Atrophy] : atrophy [Cystocele] : a cystocele [Uterine Prolapse] : uterine prolapse [No Bleeding] : there was no active vaginal bleeding [Anxiety] : patient is not anxious [Tenderness] : ~T no ~M abdominal tenderness observed [Distended] : not distended none

## 2023-08-31 ENCOUNTER — APPOINTMENT (OUTPATIENT)
Dept: OPHTHALMOLOGY | Facility: CLINIC | Age: 78
End: 2023-08-31

## 2023-10-02 ENCOUNTER — INPATIENT (INPATIENT)
Facility: HOSPITAL | Age: 78
LOS: 2 days | Discharge: HOME HEALTH | End: 2023-10-05
Attending: STUDENT IN AN ORGANIZED HEALTH CARE EDUCATION/TRAINING PROGRAM | Admitting: STUDENT IN AN ORGANIZED HEALTH CARE EDUCATION/TRAINING PROGRAM
Payer: MEDICARE

## 2023-10-02 VITALS
DIASTOLIC BLOOD PRESSURE: 78 MMHG | SYSTOLIC BLOOD PRESSURE: 119 MMHG | RESPIRATION RATE: 16 BRPM | HEART RATE: 85 BPM | OXYGEN SATURATION: 90 % | TEMPERATURE: 98 F

## 2023-10-02 LAB
ALBUMIN SERPL ELPH-MCNC: 3.8 G/DL — SIGNIFICANT CHANGE UP (ref 3.3–5)
ALP SERPL-CCNC: 75 U/L — SIGNIFICANT CHANGE UP (ref 40–120)
ALT FLD-CCNC: 24 U/L — SIGNIFICANT CHANGE UP (ref 12–78)
ANION GAP SERPL CALC-SCNC: 5 MMOL/L — SIGNIFICANT CHANGE UP (ref 5–17)
APTT BLD: 25.1 SEC — SIGNIFICANT CHANGE UP (ref 24.5–35.6)
AST SERPL-CCNC: 26 U/L — SIGNIFICANT CHANGE UP (ref 15–37)
BASOPHILS # BLD AUTO: 0.03 K/UL — SIGNIFICANT CHANGE UP (ref 0–0.2)
BASOPHILS NFR BLD AUTO: 0.3 % — SIGNIFICANT CHANGE UP (ref 0–2)
BILIRUB SERPL-MCNC: 0.4 MG/DL — SIGNIFICANT CHANGE UP (ref 0.2–1.2)
BUN SERPL-MCNC: 14 MG/DL — SIGNIFICANT CHANGE UP (ref 7–23)
CALCIUM SERPL-MCNC: 9.7 MG/DL — SIGNIFICANT CHANGE UP (ref 8.5–10.1)
CHLORIDE SERPL-SCNC: 108 MMOL/L — SIGNIFICANT CHANGE UP (ref 96–108)
CO2 SERPL-SCNC: 27 MMOL/L — SIGNIFICANT CHANGE UP (ref 22–31)
CREAT SERPL-MCNC: 0.93 MG/DL — SIGNIFICANT CHANGE UP (ref 0.5–1.3)
D DIMER BLD IA.RAPID-MCNC: 8245 NG/ML DDU — HIGH
EGFR: 63 ML/MIN/1.73M2 — SIGNIFICANT CHANGE UP
EOSINOPHIL # BLD AUTO: 0.05 K/UL — SIGNIFICANT CHANGE UP (ref 0–0.5)
EOSINOPHIL NFR BLD AUTO: 0.6 % — SIGNIFICANT CHANGE UP (ref 0–6)
GLUCOSE SERPL-MCNC: 182 MG/DL — HIGH (ref 70–99)
HCT VFR BLD CALC: 34.2 % — LOW (ref 34.5–45)
HGB BLD-MCNC: 11 G/DL — LOW (ref 11.5–15.5)
IMM GRANULOCYTES NFR BLD AUTO: 0.3 % — SIGNIFICANT CHANGE UP (ref 0–0.9)
INR BLD: 0.92 RATIO — SIGNIFICANT CHANGE UP (ref 0.85–1.18)
LYMPHOCYTES # BLD AUTO: 0.88 K/UL — LOW (ref 1–3.3)
LYMPHOCYTES # BLD AUTO: 9.7 % — LOW (ref 13–44)
MAGNESIUM SERPL-MCNC: 1.8 MG/DL — SIGNIFICANT CHANGE UP (ref 1.6–2.6)
MCHC RBC-ENTMCNC: 31.4 PG — SIGNIFICANT CHANGE UP (ref 27–34)
MCHC RBC-ENTMCNC: 32.2 G/DL — SIGNIFICANT CHANGE UP (ref 32–36)
MCV RBC AUTO: 97.7 FL — SIGNIFICANT CHANGE UP (ref 80–100)
MONOCYTES # BLD AUTO: 0.57 K/UL — SIGNIFICANT CHANGE UP (ref 0–0.9)
MONOCYTES NFR BLD AUTO: 6.3 % — SIGNIFICANT CHANGE UP (ref 2–14)
NEUTROPHILS # BLD AUTO: 7.5 K/UL — HIGH (ref 1.8–7.4)
NEUTROPHILS NFR BLD AUTO: 82.8 % — HIGH (ref 43–77)
NRBC # BLD: 0 /100 WBCS — SIGNIFICANT CHANGE UP (ref 0–0)
NT-PROBNP SERPL-SCNC: 44 PG/ML — SIGNIFICANT CHANGE UP (ref 0–450)
PLATELET # BLD AUTO: 193 K/UL — SIGNIFICANT CHANGE UP (ref 150–400)
POTASSIUM SERPL-MCNC: 4 MMOL/L — SIGNIFICANT CHANGE UP (ref 3.5–5.3)
POTASSIUM SERPL-SCNC: 4 MMOL/L — SIGNIFICANT CHANGE UP (ref 3.5–5.3)
PROT SERPL-MCNC: 8.9 GM/DL — HIGH (ref 6–8.3)
PROTHROM AB SERPL-ACNC: 11 SEC — SIGNIFICANT CHANGE UP (ref 9.5–13)
RBC # BLD: 3.5 M/UL — LOW (ref 3.8–5.2)
RBC # FLD: 13.3 % — SIGNIFICANT CHANGE UP (ref 10.3–14.5)
SODIUM SERPL-SCNC: 140 MMOL/L — SIGNIFICANT CHANGE UP (ref 135–145)
TROPONIN I, HIGH SENSITIVITY RESULT: 463 NG/L — HIGH
TROPONIN I, HIGH SENSITIVITY RESULT: 98 NG/L — HIGH
WBC # BLD: 9.06 K/UL — SIGNIFICANT CHANGE UP (ref 3.8–10.5)
WBC # FLD AUTO: 9.06 K/UL — SIGNIFICANT CHANGE UP (ref 3.8–10.5)

## 2023-10-02 PROCEDURE — 99285 EMERGENCY DEPT VISIT HI MDM: CPT

## 2023-10-02 PROCEDURE — 70450 CT HEAD/BRAIN W/O DYE: CPT | Mod: 26,MA

## 2023-10-02 PROCEDURE — 93010 ELECTROCARDIOGRAM REPORT: CPT

## 2023-10-02 PROCEDURE — 71045 X-RAY EXAM CHEST 1 VIEW: CPT | Mod: 26

## 2023-10-02 PROCEDURE — 99222 1ST HOSP IP/OBS MODERATE 55: CPT

## 2023-10-02 PROCEDURE — 71275 CT ANGIOGRAPHY CHEST: CPT | Mod: 26,MA

## 2023-10-02 RX ORDER — ACETAMINOPHEN 500 MG
650 TABLET ORAL EVERY 6 HOURS
Refills: 0 | Status: DISCONTINUED | OUTPATIENT
Start: 2023-10-02 | End: 2023-10-05

## 2023-10-02 RX ORDER — ONDANSETRON 8 MG/1
4 TABLET, FILM COATED ORAL EVERY 8 HOURS
Refills: 0 | Status: DISCONTINUED | OUTPATIENT
Start: 2023-10-02 | End: 2023-10-05

## 2023-10-02 RX ORDER — LANOLIN ALCOHOL/MO/W.PET/CERES
3 CREAM (GRAM) TOPICAL AT BEDTIME
Refills: 0 | Status: DISCONTINUED | OUTPATIENT
Start: 2023-10-02 | End: 2023-10-05

## 2023-10-02 RX ORDER — SODIUM CHLORIDE 9 MG/ML
1000 INJECTION INTRAMUSCULAR; INTRAVENOUS; SUBCUTANEOUS ONCE
Refills: 0 | Status: COMPLETED | OUTPATIENT
Start: 2023-10-02 | End: 2023-10-02

## 2023-10-02 RX ORDER — HEPARIN SODIUM 5000 [USP'U]/ML
INJECTION INTRAVENOUS; SUBCUTANEOUS
Qty: 25000 | Refills: 0 | Status: DISCONTINUED | OUTPATIENT
Start: 2023-10-02 | End: 2023-10-03

## 2023-10-02 RX ORDER — HEPARIN SODIUM 5000 [USP'U]/ML
4000 INJECTION INTRAVENOUS; SUBCUTANEOUS ONCE
Refills: 0 | Status: COMPLETED | OUTPATIENT
Start: 2023-10-02 | End: 2023-10-02

## 2023-10-02 RX ADMIN — HEPARIN SODIUM 1000 UNIT(S)/HR: 5000 INJECTION INTRAVENOUS; SUBCUTANEOUS at 19:44

## 2023-10-02 RX ADMIN — SODIUM CHLORIDE 1000 MILLILITER(S): 9 INJECTION INTRAMUSCULAR; INTRAVENOUS; SUBCUTANEOUS at 13:00

## 2023-10-02 RX ADMIN — HEPARIN SODIUM 4000 UNIT(S): 5000 INJECTION INTRAVENOUS; SUBCUTANEOUS at 19:44

## 2023-10-02 NOTE — ED ADULT NURSE NOTE - NSFALLRISKINTERV_ED_ALL_ED

## 2023-10-02 NOTE — ED ADULT NURSE NOTE - NEURO MENTATION
Detail Level: Detailed normal Detail Level: Generalized Sunscreen Recommendations: Use a mineral sunscreen daily to sun exposed areas. Reapply every 80 minutes or after sweating or swimming.

## 2023-10-02 NOTE — CONSULT NOTE ADULT - SUBJECTIVE AND OBJECTIVE BOX
BRIEF HOSPITAL COURSE: 78 year old female PMHx Anemia, Remote NHL ( Rt only 2002).  Admitted with SOB after 7 hour car ride from [facility] this AM.  Recent unplanned weight loss and being DARBY for possible  recurrence of NHL with her Oncologist.  patient has no complaints on exam and denies any SOB now or CP, N/V/D.     Events last 24 hours: Found to have extensive B/L Segmental PE's with RV strain on CT with elivated Bio markers.  HDS on Exam on Room Air.     RISK STRATIFICATION FOR PULMONARY EMBOLISM:    sPESI (simplified pulmonary embolism severity index); The sPESI assigns one point for each of the following variables:   age >80 years  history of cancer   chronic cardiopulmonary disease   a heart rate =110 beats per minute   a systolic blood pressure <100 mmHg    an arterial oxyhemoglobin saturation <90 percent.    If sPESI score is zero, mortality 1%; if one or more, mortality 10%.      sPESI score for this patient: 1  RV:LV ratio:   Biomarkers (BNP/Troponin): ***  FINAL RISK STRATIFICATION:                LOW RISK                INTERMEDIATE-LOW RISK                INTERMEDIATE-HIGH RISK                HIGH RISK    PAST MEDICAL & SURGICAL HISTORY:    Allergies    No Known Allergies    Intolerances      FAMILY HISTORY:      Review of Systems:  CONSTITUTIONAL: No fever, chills, or fatigue  EYES: No eye pain, visual disturbances, or discharge  ENMT:  No difficulty hearing, tinnitus, vertigo; No sinus or throat pain  NECK: No pain or stiffness  RESPIRATORY: No cough, wheezing, chills or hemoptysis; No shortness of breath  CARDIOVASCULAR: No chest pain, palpitations, dizziness, or leg swelling  GASTROINTESTINAL: No abdominal or epigastric pain. No nausea, vomiting, or hematemesis; No diarrhea or constipation. No melena or hematochezia.  GENITOURINARY: No dysuria, frequency, hematuria, or incontinence  NEUROLOGICAL: No headaches, memory loss, loss of strength, numbness, or tremors  SKIN: No itching, burning, rashes, or lesions   MUSCULOSKELETAL: No joint pain or swelling; No muscle, back, or extremity pain  PSYCHIATRIC: No depression, anxiety, mood swings, or difficulty sleeping      Medications:            heparin  Infusion.  Unit(s)/Hr IV Continuous <Continuous>                        ICU Vital Signs Last 24 Hrs  T(C): 36.8 (02 Oct 2023 19:30), Max: 36.8 (02 Oct 2023 19:30)  T(F): 98.3 (02 Oct 2023 19:30), Max: 98.3 (02 Oct 2023 19:30)  HR: 80 (02 Oct 2023 20:30) (80 - 85)  BP: 150/80 (02 Oct 2023 20:30) (119/78 - 150/80)  BP(mean): --  ABP: --  ABP(mean): --  RR: 19 (02 Oct 2023 20:30) (16 - 19)  SpO2: 96% (02 Oct 2023 20:30) (90% - 97%)    O2 Parameters below as of 02 Oct 2023 20:30  Patient On (Oxygen Delivery Method): room air          Vital Signs Last 24 Hrs  T(C): 36.8 (02 Oct 2023 19:30), Max: 36.8 (02 Oct 2023 19:30)  T(F): 98.3 (02 Oct 2023 19:30), Max: 98.3 (02 Oct 2023 19:30)  HR: 80 (02 Oct 2023 20:30) (80 - 85)  BP: 150/80 (02 Oct 2023 20:30) (119/78 - 150/80)  BP(mean): --  RR: 19 (02 Oct 2023 20:30) (16 - 19)  SpO2: 96% (02 Oct 2023 20:30) (90% - 97%)    Parameters below as of 02 Oct 2023 20:30  Patient On (Oxygen Delivery Method): room air            I&O's Detail        LABS:                        11.0   9.06  )-----------( 193      ( 02 Oct 2023 12:20 )             34.2     10-02    140  |  108  |  14  ----------------------------<  182<H>  4.0   |  27  |  0.93    Ca    9.7      02 Oct 2023 12:20  Mg     1.8     10-02    TPro  8.9<H>  /  Alb  3.8  /  TBili  0.4  /  DBili  x   /  AST  26  /  ALT  24  /  AlkPhos  75  10-02          CAPILLARY BLOOD GLUCOSE        PT/INR - ( 02 Oct 2023 12:20 )   PT: 11.0 sec;   INR: 0.92 ratio         PTT - ( 02 Oct 2023 12:20 )  PTT:25.1 sec  Urinalysis Basic - ( 02 Oct 2023 12:20 )    Color: x / Appearance: x / SG: x / pH: x  Gluc: 182 mg/dL / Ketone: x  / Bili: x / Urobili: x   Blood: x / Protein: x / Nitrite: x   Leuk Esterase: x / RBC: x / WBC x   Sq Epi: x / Non Sq Epi: x / Bacteria: x      CULTURES:      Physical Examination:    General: No acute distress.      HEENT: Pupils equal, reactive to light.  Symmetric.    PULM: Clear to auscultation bilaterally, no significant sputum production    CVS: Regular rate and rhythm, no murmurs, rubs, or gallops    ABD: Soft, nondistended, nontender, normoactive bowel sounds, no masses    EXT: No edema, nontender    SKIN: Warm and well perfused, no rashes noted.    NEURO: Alert, oriented, interactive, nonfocal    RADIOLOGY: ***    CRITICAL CARE TIME SPENT: ***       BRIEF HOSPITAL COURSE: 78 year old female PMHx Anemia, Remote NHL ( Rt only 2002).  Admitted with SOB after 7 hour car ride from [facility] this AM.  Recent unplanned weight loss and being DARBY for possible  recurrence of NHL with her Oncologist.  patient has no complaints on exam and denies any SOB now or CP, N/V/D.     Events last 24 hours: Found to have extensive B/L Segmental PE's with RV strain on CT with elevated Bio markers.  HDS on Exam on Room Air.     RISK STRATIFICATION FOR PULMONARY EMBOLISM:    sPESI (simplified pulmonary embolism severity index); The sPESI assigns one point for each of the following variables:   age >80 years  history of cancer   chronic cardiopulmonary disease   a heart rate =110 beats per minute   a systolic blood pressure <100 mmHg    an arterial oxyhemoglobin saturation <90 percent.    If sPESI score is zero, mortality 1%; if one or more, mortality 10%.      sPESI score for this patient: 1  RV:LV ratio: 49.3mm/45.2mm = 0.9  Biomarkers (BNP/Troponin):  44 / 463  FINAL RISK STRATIFICATION:               INTERMEDIATE-HIGH RISK        PAST MEDICAL & SURGICAL HISTORY:    Allergies    No Known Allergies    Intolerances      Review of Systems:  CONSTITUTIONAL: No fever, chills, +  fatigue  EYES: No eye pain, visual disturbances, or discharge  ENMT:  No difficulty hearing, tinnitus, vertigo; No sinus or throat pain  NECK: No pain or stiffness  RESPIRATORY: No cough, wheezing, chills or hemoptysis; + shortness of breath  CARDIOVASCULAR: No chest pain, palpitations, dizziness, or leg swelling  GASTROINTESTINAL: No abdominal or epigastric pain. No nausea, vomiting, or hematemesis; No diarrhea or constipation. No melena or hematochezia.  GENITOURINARY: No dysuria, frequency, hematuria, or incontinence  NEUROLOGICAL: No headaches, memory loss, loss of strength, numbness, or tremors  SKIN: No itching, burning, rashes, or lesions   MUSCULOSKELETAL: No joint pain or swelling; No muscle, back, or extremity pain  PSYCHIATRIC: No depression, anxiety, mood swings, or difficulty sleeping      Medications:  heparin  Infusion.  Unit(s)/Hr IV Continuous <Continuous>          ICU Vital Signs Last 24 Hrs  T(C): 36.8 (02 Oct 2023 19:30), Max: 36.8 (02 Oct 2023 19:30)  T(F): 98.3 (02 Oct 2023 19:30), Max: 98.3 (02 Oct 2023 19:30)  HR: 80 (02 Oct 2023 20:30) (80 - 85)  BP: 150/80 (02 Oct 2023 20:30) (119/78 - 150/80)  RR: 19 (02 Oct 2023 20:30) (16 - 19)  SpO2: 96% (02 Oct 2023 20:30) (90% - 97%)    O2 Parameters below as of 02 Oct 2023 20:30  Patient On (Oxygen Delivery Method): room air          Vital Signs Last 24 Hrs  T(C): 36.8 (02 Oct 2023 19:30), Max: 36.8 (02 Oct 2023 19:30)  T(F): 98.3 (02 Oct 2023 19:30), Max: 98.3 (02 Oct 2023 19:30)  HR: 80 (02 Oct 2023 20:30) (80 - 85)  BP: 150/80 (02 Oct 2023 20:30) (119/78 - 150/80)  RR: 19 (02 Oct 2023 20:30) (16 - 19)  SpO2: 96% (02 Oct 2023 20:30) (90% - 97%)    Parameters below as of 02 Oct 2023 20:30  Patient On (Oxygen Delivery Method): room air            LABS:                        11.0   9.06  )-----------( 193      ( 02 Oct 2023 12:20 )             34.2     10-02    140  |  108  |  14  ----------------------------<  182<H>  4.0   |  27  |  0.93    Ca    9.7      02 Oct 2023 12:20  Mg     1.8     10-02    TPro  8.9<H>  /  Alb  3.8  /  TBili  0.4  /  DBili  x   /  AST  26  /  ALT  24  /  AlkPhos  75  10-02          CAPILLARY BLOOD GLUCOSE        PT/INR - ( 02 Oct 2023 12:20 )   PT: 11.0 sec;   INR: 0.92 ratio         PTT - ( 02 Oct 2023 12:20 )  PTT:25.1 sec  Urinalysis Basic - ( 02 Oct 2023 12:20 )    Color: x / Appearance: x / SG: x / pH: x  Gluc: 182 mg/dL / Ketone: x  / Bili: x / Urobili: x   Blood: x / Protein: x / Nitrite: x   Leuk Esterase: x / RBC: x / WBC x   Sq Epi: x / Non Sq Epi: x / Bacteria: x          Physical Examination:    General: No acute distress.      HEENT: Pupils equal, reactive to light.  Symmetric.    PULM: Clear to auscultation bilaterally, no significant sputum production    CVS: Regular rate and rhythm, no murmurs, rubs, or gallops    ABD: Soft, nondistended, nontender, normoactive bowel sounds, no masses    EXT: No edema, nontender    SKIN: Warm and well perfused, no rashes noted.    NEURO: Alert, oriented, interactive, nonfocal        RADIOLOGY:  < from: CT Angio Chest PE Protocol w/ IV Cont (10.02.23 @ 17:28) >    ACC: [id number] EXAM:  CT ANGIO CHEST PULM ART WAWI   ORDERED BY: YADIRA MAGALLANES     PROCEDURE DATE:  10/02/2023          INTERPRETATION:  CLINICAL INFORMATION: Shortness of breath, hypoxia    COMPARISON: None.    CONTRAST/COMPLICATIONS:  IV Contrast: Omnipaque 350  65 cc administered   35 cc discarded  Oral Contrast: NONE  Complications: None reported at time of study completion    PROCEDURE:  CT Angiography of the Chest.  Sagittal and coronal reformats were performed as well as 3D (MIP)   reconstructions.    FINDINGS:    LUNGS AND AIRWAYS: Mild pleural parenchymal scarring in the apices. No   consolidation.  Central airways are clear.  PLEURA: No pleural effusion.  MEDIASTINUM AND TAMI: No mediastinal hemorrhage.  VESSELS: Pulmonary arteries areopacified to the segmental level. There   are extensive acute bilateral pulmonary emboli involving many segmental   branches in all the pulmonary lobes. There are secondary findings of   acute right heart strain.  HEART: Heart size is normal. No pericardial effusion.  CHEST WALL AND LOWER NECK: Within normal limits.  VISUALIZED UPPER ABDOMEN: Within normal limits.  BONES: Degenerative changes in the spine.    IMPRESSION:    1. Extensive acute bilateral pulmonary emboli involving segmental   branches, with secondary findings of acute right heart strain.    < end of copied text >      TIME SPENT: 55 minutes

## 2023-10-02 NOTE — ED ADULT NURSE REASSESSMENT NOTE - NS ED NURSE REASSESS COMMENT FT1
Pt received in stretcher AO x4 with daughter at bedside. Pt reconnected to cardiac monitor and placed on new purwick. Pt denies chest pain, headache or SOB. Pt speaking in clear complete sentences. No acute distress noted.

## 2023-10-02 NOTE — ED PROVIDER NOTE - CLINICAL SUMMARY MEDICAL DECISION MAKING FREE TEXT BOX
+pe with RHS, appears comfortable, pert team says can stay here. per icu can go to tele.   on heparin drip, will admit

## 2023-10-02 NOTE — CONSULT NOTE ADULT - ASSESSMENT
78 year old female PMHx Anemia, Remote NHL ( Rt only 2002).  Admitted with SOB after 7 hour car ride from [facility] this AM.      Extensive B/L Segmental PE   Intermediate High Risk Pulmonary Embolism   Mild RV strain on CT with elevated Bio markers        Unlabored on NC  HDS on exam  Full AC for PE with Heparin started in ED   Diet as tolerates   Watch UOP and Lytes   No Infective concerns   Check LE dopplers   Case D/W ICU Dr Gill who agrees that at this time patient does not require ICU level of care. 78 year old female PMHx Anemia, Remote NHL ( Rt only 2002).  Admitted with SOB after 7 hour car ride from [facility] this AM.      Extensive B/L Segmental PE   Intermediate High Risk Pulmonary Embolism   Mild RV strain on CT with elevated Bio markers        Unlabored on NC  HDS on exam  Full AC for PE with Heparin started in ED   Needs official ECHO   Diet as tolerates   Watch UOP and Lytes   No Infective concerns   Check LE dopplers   Case D/W ICU Dr Gill who agrees that at this time patient does not require ICU level of care.

## 2023-10-02 NOTE — ED PROVIDER NOTE - PROGRESS NOTE DETAILS
TOI MAGALLANES: discussed with PERT team. pt was seen treated by Dr. Zamarripa ct chest/head done perc team from transfer center was called and sts pt does not need to be transferred. ICU Dr. Gill was notified.  Pt appears very comfortable. heparin ivpb was started. TOI MAGALLANES: icu relates can go to tele.

## 2023-10-02 NOTE — ED ADULT NURSE NOTE - OBJECTIVE STATEMENT
78YF A&Ox4, ambulates indep at baseline, presenting to ED with generalized weakness and symptoms of dehydration since this morning. pt daughter at bedside and reports that pt went on family trip this past weekend and was sitting in the car for several hours, pt did not want to drink because she was concerned that she would have to urinate frequently. as a result, pt was very weak and unable to stand up on her own when the family arrived back home. pt slid to the floor with assistance from family members after she went to the bathroom. pt denies LOC, h/a, blurred vision. NKDA.

## 2023-10-02 NOTE — ED ADULT TRIAGE NOTE - CHIEF COMPLAINT QUOTE
BIBEMS from home c/o feeling weak and dizzy after using the restroom. as per family at bedside pt was just on a long car trip to [place] over the weekend. pt AAO x 3 states she has hx of anemia denies straining  to pass stool or urinary changes

## 2023-10-02 NOTE — CONSULT NOTE ADULT - NS ATTEND AMEND GEN_ALL_CORE FT
Pt is a 79 yo BF with h/o anemia, pre-DM, NHL s/p RT who after a 7 hr car ride was feeling weak and dizzy associated with slight SOB. Pt also c/o LE tenderness. Pt is scheduled for imaging 10/6 to r/o recurrence of NHL 2 to weight loss over the past year. In the ER pt sent for CTA: Extensive acute bilateral pulmonary emboli involving segmental branches, with secondary findings of acute right heart strain. In the ER pt hemodynamically and resp-wise stable. Admitting dx: Submassive PE by PESI score high risk. Pt is hemodynamically (HR 77-85 -150), resp-wise stable (O2 sat 96-99% on RA) and minimally symptomatic. Pt does not require to be managed in the ICU. Cont AC with IV Heparin. Obtain formal Echo and LE venous duplex. Consider Pulm consult. Plan discussed at length with pt and her daughter.

## 2023-10-02 NOTE — ED PROVIDER NOTE - CRITERIA ADMIT PEDS PT
Apply antibiotic ointment and keep cut covered for the next 2 days (today and tomorrow)  After that okay to leave it open to air or cover with band aid as needed  Avoid any dirt or debris into the cut until it fully heals  No soaking the hand in water until cut heals (showers and handwashing ok)  If having any signs of infection then follow up right away with your primary doctor or return to urgent care for reevaluation       No

## 2023-10-02 NOTE — ED PROVIDER NOTE - OBJECTIVE STATEMENT
79 y/o F with PMH anemia, pre-DM, remote hx of NHL s/p RT presents with SOB, dizziness., 77 y/o F with PMH anemia, pre-DM, remote hx of NHL s/p RT presents with SOB/PLUMMER, lightheadedness, x today in setting of recent 7 hr drive 4 days ago and another 7 hr drive this am.   also relates increased amount of weight loss over the last year and is due to have imaging 10/6 to eval for possible recurrence of nhl.   no cp, back pain, ab pain, n/v/d, diaphoresis, trauma, fall.   Denies hemoptysis, recent surgery/immobilization, hx PE/DVT, hormone use, calf pain or swelling.

## 2023-10-02 NOTE — ED ADULT NURSE NOTE - CHIEF COMPLAINT QUOTE
BIBEMS from home c/o feeling weak and dizzy after using the restroom. as per family at bedside pt was just on a long car trip to [place] over the weekend. pt AAO x 3 states she has hx of anemia denies straining  to pass stool or urinary changes

## 2023-10-02 NOTE — ED PROVIDER NOTE - PRINCIPAL DIAGNOSIS
"Chief Complaint   Patient presents with     Well Child     3 year     Pre Visit Planning - Done       Initial BP 97/62  Pulse 117  Temp 97.6  F (36.4  C) (Tympanic)  Ht 2' 11\" (0.889 m)  Wt 27 lb 4 oz (12.4 kg)  HC 20\" (50.8 cm)  SpO2 98%  BMI 15.64 kg/m2 Estimated body mass index is 15.64 kg/(m^2) as calculated from the following:    Height as of this encounter: 2' 11\" (0.889 m).    Weight as of this encounter: 27 lb 4 oz (12.4 kg).  Medication Reconciliation: complete   Gil Queen MA      " Pulmonary embolism

## 2023-10-02 NOTE — ED PROVIDER NOTE - PHYSICAL EXAMINATION
PHYSICAL EXAM:    GENERAL: Alert, appears stated age, well appearing, non-toxic  SKIN: Warm, and dry. MMM.   HEAD: NC, AT  EYE: Normal lids/conjunctiva  ENT: Normal hearing, patent oropharynx  NECK: +supple. No meningismus, or JVD  Pulm: Bilateral BS, normal resp effort, no wheezes, stridor, or retractions; on exertion/sitting up/moving to lay down O2 sat goes to 89 on RA; if staying still o2 sat @98% on RA. speaking in complete sentences.   CV: RRR, no M/R/G, 2+and = radial pulses  Abd: soft, non-tender, non-distended  Mskel: no erythema, cyanosis, edema. no calf tenderness  Neuro: AAOx3, 5/5 strength.

## 2023-10-03 DIAGNOSIS — I26.99 OTHER PULMONARY EMBOLISM WITHOUT ACUTE COR PULMONALE: ICD-10-CM

## 2023-10-03 LAB
ANION GAP SERPL CALC-SCNC: 5 MMOL/L — SIGNIFICANT CHANGE UP (ref 5–17)
APTT BLD: 126.6 SEC — CRITICAL HIGH (ref 24.5–35.6)
APTT BLD: 181.4 SEC — CRITICAL HIGH (ref 24.5–35.6)
APTT BLD: 81.2 SEC — HIGH (ref 24.5–35.6)
BUN SERPL-MCNC: 11 MG/DL — SIGNIFICANT CHANGE UP (ref 7–23)
CALCIUM SERPL-MCNC: 9.4 MG/DL — SIGNIFICANT CHANGE UP (ref 8.5–10.1)
CHLORIDE SERPL-SCNC: 110 MMOL/L — HIGH (ref 96–108)
CO2 SERPL-SCNC: 25 MMOL/L — SIGNIFICANT CHANGE UP (ref 22–31)
CREAT SERPL-MCNC: 0.77 MG/DL — SIGNIFICANT CHANGE UP (ref 0.5–1.3)
EGFR: 79 ML/MIN/1.73M2 — SIGNIFICANT CHANGE UP
GLUCOSE SERPL-MCNC: 135 MG/DL — HIGH (ref 70–99)
HCT VFR BLD CALC: 28.6 % — LOW (ref 34.5–45)
HCT VFR BLD CALC: 30.5 % — LOW (ref 34.5–45)
HCT VFR BLD CALC: 30.7 % — LOW (ref 34.5–45)
HGB BLD-MCNC: 10.2 G/DL — LOW (ref 11.5–15.5)
HGB BLD-MCNC: 9.6 G/DL — LOW (ref 11.5–15.5)
HGB BLD-MCNC: 9.9 G/DL — LOW (ref 11.5–15.5)
MCHC RBC-ENTMCNC: 31 PG — SIGNIFICANT CHANGE UP (ref 27–34)
MCHC RBC-ENTMCNC: 31.8 PG — SIGNIFICANT CHANGE UP (ref 27–34)
MCHC RBC-ENTMCNC: 31.9 PG — SIGNIFICANT CHANGE UP (ref 27–34)
MCHC RBC-ENTMCNC: 32.5 G/DL — SIGNIFICANT CHANGE UP (ref 32–36)
MCHC RBC-ENTMCNC: 33.2 G/DL — SIGNIFICANT CHANGE UP (ref 32–36)
MCHC RBC-ENTMCNC: 33.6 G/DL — SIGNIFICANT CHANGE UP (ref 32–36)
MCV RBC AUTO: 95 FL — SIGNIFICANT CHANGE UP (ref 80–100)
MCV RBC AUTO: 95.6 FL — SIGNIFICANT CHANGE UP (ref 80–100)
MCV RBC AUTO: 95.6 FL — SIGNIFICANT CHANGE UP (ref 80–100)
NRBC # BLD: 0 /100 WBCS — SIGNIFICANT CHANGE UP (ref 0–0)
PLATELET # BLD AUTO: 155 K/UL — SIGNIFICANT CHANGE UP (ref 150–400)
PLATELET # BLD AUTO: 162 K/UL — SIGNIFICANT CHANGE UP (ref 150–400)
PLATELET # BLD AUTO: 171 K/UL — SIGNIFICANT CHANGE UP (ref 150–400)
POTASSIUM SERPL-MCNC: 3.7 MMOL/L — SIGNIFICANT CHANGE UP (ref 3.5–5.3)
POTASSIUM SERPL-SCNC: 3.7 MMOL/L — SIGNIFICANT CHANGE UP (ref 3.5–5.3)
RBC # BLD: 3.01 M/UL — LOW (ref 3.8–5.2)
RBC # BLD: 3.19 M/UL — LOW (ref 3.8–5.2)
RBC # BLD: 3.21 M/UL — LOW (ref 3.8–5.2)
RBC # FLD: 13.2 % — SIGNIFICANT CHANGE UP (ref 10.3–14.5)
RBC # FLD: 13.2 % — SIGNIFICANT CHANGE UP (ref 10.3–14.5)
RBC # FLD: 13.4 % — SIGNIFICANT CHANGE UP (ref 10.3–14.5)
SODIUM SERPL-SCNC: 140 MMOL/L — SIGNIFICANT CHANGE UP (ref 135–145)
WBC # BLD: 6.74 K/UL — SIGNIFICANT CHANGE UP (ref 3.8–10.5)
WBC # BLD: 7.15 K/UL — SIGNIFICANT CHANGE UP (ref 3.8–10.5)
WBC # BLD: 8.15 K/UL — SIGNIFICANT CHANGE UP (ref 3.8–10.5)
WBC # FLD AUTO: 6.74 K/UL — SIGNIFICANT CHANGE UP (ref 3.8–10.5)
WBC # FLD AUTO: 7.15 K/UL — SIGNIFICANT CHANGE UP (ref 3.8–10.5)
WBC # FLD AUTO: 8.15 K/UL — SIGNIFICANT CHANGE UP (ref 3.8–10.5)

## 2023-10-03 PROCEDURE — 99222 1ST HOSP IP/OBS MODERATE 55: CPT

## 2023-10-03 PROCEDURE — 93970 EXTREMITY STUDY: CPT | Mod: 26

## 2023-10-03 PROCEDURE — 99232 SBSQ HOSP IP/OBS MODERATE 35: CPT

## 2023-10-03 PROCEDURE — 93306 TTE W/DOPPLER COMPLETE: CPT | Mod: 26

## 2023-10-03 RX ORDER — HEPARIN SODIUM 5000 [USP'U]/ML
2000 INJECTION INTRAVENOUS; SUBCUTANEOUS EVERY 6 HOURS
Refills: 0 | Status: DISCONTINUED | OUTPATIENT
Start: 2023-10-03 | End: 2023-10-04

## 2023-10-03 RX ORDER — HEPARIN SODIUM 5000 [USP'U]/ML
800 INJECTION INTRAVENOUS; SUBCUTANEOUS
Qty: 25000 | Refills: 0 | Status: DISCONTINUED | OUTPATIENT
Start: 2023-10-03 | End: 2023-10-03

## 2023-10-03 RX ORDER — HEPARIN SODIUM 5000 [USP'U]/ML
4000 INJECTION INTRAVENOUS; SUBCUTANEOUS EVERY 6 HOURS
Refills: 0 | Status: DISCONTINUED | OUTPATIENT
Start: 2023-10-03 | End: 2023-10-04

## 2023-10-03 RX ORDER — HEPARIN SODIUM 5000 [USP'U]/ML
700 INJECTION INTRAVENOUS; SUBCUTANEOUS
Qty: 25000 | Refills: 0 | Status: DISCONTINUED | OUTPATIENT
Start: 2023-10-03 | End: 2023-10-04

## 2023-10-03 RX ADMIN — Medication 650 MILLIGRAM(S): at 18:34

## 2023-10-03 RX ADMIN — HEPARIN SODIUM 700 UNIT(S)/HR: 5000 INJECTION INTRAVENOUS; SUBCUTANEOUS at 13:46

## 2023-10-03 RX ADMIN — HEPARIN SODIUM 0 UNIT(S)/HR: 5000 INJECTION INTRAVENOUS; SUBCUTANEOUS at 12:22

## 2023-10-03 RX ADMIN — HEPARIN SODIUM 0 UNIT(S)/HR: 5000 INJECTION INTRAVENOUS; SUBCUTANEOUS at 07:44

## 2023-10-03 RX ADMIN — Medication 650 MILLIGRAM(S): at 08:10

## 2023-10-03 RX ADMIN — HEPARIN SODIUM 700 UNIT(S)/HR: 5000 INJECTION INTRAVENOUS; SUBCUTANEOUS at 19:18

## 2023-10-03 RX ADMIN — HEPARIN SODIUM 0 UNIT(S)/HR: 5000 INJECTION INTRAVENOUS; SUBCUTANEOUS at 12:20

## 2023-10-03 RX ADMIN — Medication 650 MILLIGRAM(S): at 17:34

## 2023-10-03 RX ADMIN — Medication 650 MILLIGRAM(S): at 09:04

## 2023-10-03 RX ADMIN — HEPARIN SODIUM 1000 UNIT(S)/HR: 5000 INJECTION INTRAVENOUS; SUBCUTANEOUS at 01:24

## 2023-10-03 RX ADMIN — HEPARIN SODIUM 0 UNIT(S)/HR: 5000 INJECTION INTRAVENOUS; SUBCUTANEOUS at 03:22

## 2023-10-03 RX ADMIN — HEPARIN SODIUM 700 UNIT(S)/HR: 5000 INJECTION INTRAVENOUS; SUBCUTANEOUS at 20:47

## 2023-10-03 NOTE — H&P ADULT - ASSESSMENT
78F w/ hx of NHL s/p RT p/w SOB. Found to have b/l PEs    #PE  - continue hep gtt  - LE doppler    #FEN/PPX  - regular diet  - hep gtt for DVT ppx

## 2023-10-03 NOTE — CONSULT NOTE ADULT - SUBJECTIVE AND OBJECTIVE BOX
HPI:  78F w/ hx of NHL s/p RT p/w SOB. Pt endorse 7+ hr drive twice this week. Denies CP, palpitations, fever/chills/sweats.    In ED, pt afebrile and HDS. Labs notable for dimer 8245, troponin . CT PE demonstrated extensive b/l PEs w/ R heart strain. PERT team and ICU were consulted and recommended AC w/ hep gtt. (03 Oct 2023 06:36)  -------------------  Pt was travelling to [place]: drove straight there without stopping and drove back home without stopping to save time.   Reported feeling tired and with bilateral leg cramps and mild dyspnea/SOB. No hemoptysis. No significant SOB.   Hemodynamically stable.         Allergies  No Known Allergies            MEDICATIONS  (STANDING):  heparin  Infusion. 700 Unit(s)/Hr (7 mL/Hr) IV Continuous <Continuous>    MEDICATIONS  (PRN):  acetaminophen     Tablet .. 650 milliGRAM(s) Oral every 6 hours PRN Temp greater or equal to 38C (100.4F), Mild Pain (1 - 3)  aluminum hydroxide/magnesium hydroxide/simethicone Suspension 30 milliLiter(s) Oral every 4 hours PRN Dyspepsia  heparin   Injectable 3800 Unit(s) IV Push every 6 hours PRN For aPTT less than 40  melatonin 3 milliGRAM(s) Oral at bedtime PRN Insomnia  ondansetron Injectable 4 milliGRAM(s) IV Push every 8 hours PRN Nausea and/or Vomiting      Daily     Daily Weight in k.1 (04 Oct 2023 03:50)    Drug Dosing Weight    Weight (kg): 61.1 (04 Oct 2023 03:50)    PAST MEDICAL & SURGICAL HISTORY:      FAMILY HISTORY:      SOCIAL HISTORY:    ADVANCE DIRECTIVES:    REVIEW OF SYSTEMS:    CONSTITUTIONAL: No fever, weight loss, or fatigue  EYES: No eye pain, visual disturbances, or discharge  ENMT:  No difficulty hearing, tinnitus, vertigo; No sinus or throat pain  NECK: No pain or stiffness  BREASTS: No pain, masses, or nipple discharge  RESPIRATORY: No cough, wheezing, chills or hemoptysis; No shortness of breath  CARDIOVASCULAR: No chest pain, palpitations, dizziness, or leg swelling  GASTROINTESTINAL: No abdominal or epigastric pain. No nausea, vomiting, or hematemesis; No diarrhea or constipation. No melena or hematochezia.  GENITOURINARY: No dysuria, frequency, hematuria, or incontinence  NEUROLOGICAL: No headaches, memory loss, loss of strength, numbness, or tremors  SKIN: No itching, burning, rashes, or lesions   LYMPH NODES: No enlarged glands  ENDOCRINE: No heat or cold intolerance; No hair loss  MUSCULOSKELETAL: No joint pain or swelling; No muscle, back, or extremity pain  PSYCHIATRIC: No depression, anxiety, mood swings, or difficulty sleeping  HEME/LYMPH: No easy bruising, or bleeding gums  ALLERGY AND IMMUNOLOGIC: No hives or eczema          ICU Vital Signs Last 24 Hrs  T(C): 36.7 (04 Oct 2023 15:52), Max: 36.8 (04 Oct 2023 03:50)  T(F): 98 (04 Oct 2023 15:52), Max: 98.2 (04 Oct 2023 03:50)  HR: 76 (04 Oct 2023 15:52) (64 - 76)  BP: 152/74 (04 Oct 2023 15:52) (122/66 - 156/73)  BP(mean): --  ABP: --  ABP(mean): --  RR: 17 (04 Oct 2023 15:52) (16 - 20)  SpO2: 99% (04 Oct 2023 15:52) (98% - 99%)    O2 Parameters below as of 04 Oct 2023 03:50  Patient On (Oxygen Delivery Method): room air                I&O's Detail    03 Oct 2023 07:01  -  04 Oct 2023 07:00  --------------------------------------------------------  IN:    Heparin Infusion: 21 mL    Oral Fluid: 100 mL  Total IN: 121 mL    OUT:    Voided (mL): 400 mL  Total OUT: 400 mL    Total NET: -279 mL          PHYSICAL EXAM:    GENERAL: NAD, well-groomed, well-developed  HEAD:  Atraumatic, Normocephalic  EYES: EOMI, PERRLA, conjunctiva and sclera clear  ENMT: No tonsillar erythema, exudates, or enlargement; Moist mucous membranes, Good dentition, No lesions  NECK: Supple, No JVD, Normal thyroid  NERVOUS SYSTEM:  Alert & Oriented X3, Good concentration; Motor Strength 5/5 B/L upper and lower extremities; DTRs 2+ intact and symmetric  CHEST/LUNG: Clear to percussion bilaterally; No rales, rhonchi, wheezing, or rubs  HEART: Regular rate and rhythm; No murmurs, rubs, or gallops  ABDOMEN: Soft, Nontender, Nondistended; Bowel sounds present  EXTREMITIES:  2+ Peripheral Pulses, No clubbing, cyanosis, or edema  LYMPH: No lymphadenopathy noted  SKIN: No rashes or lesions    LABS:  CBC Full  -  ( 04 Oct 2023 10:55 )  WBC Count : 6.17 K/uL  RBC Count : 3.44 M/uL  Hemoglobin : 10.9 g/dL  Hematocrit : 33.2 %  Platelet Count - Automated : 162 K/uL  Mean Cell Volume : 96.5 fl  Mean Cell Hemoglobin : 31.7 pg  Mean Cell Hemoglobin Concentration : 32.8 g/dL  Auto Neutrophil # : x  Auto Lymphocyte # : x  Auto Monocyte # : x  Auto Eosinophil # : x  Auto Basophil # : x  Auto Neutrophil % : x  Auto Lymphocyte % : x  Auto Monocyte % : x  Auto Eosinophil % : x  Auto Basophil % : x    10-04    143  |  110<H>  |  10  ----------------------------<  124<H>  3.8   |  27  |  0.71    Ca    9.0      04 Oct 2023 08:40  Phos  3.1     10-04  Mg     1.7     10-04      CAPILLARY BLOOD GLUCOSE        PTT - ( 04 Oct 2023 10:55 )  PTT:89.7 sec  Urinalysis Basic - ( 04 Oct 2023 08:40 )    Color: x / Appearance: x / SG: x / pH: x  Gluc: 124 mg/dL / Ketone: x  / Bili: x / Urobili: x   Blood: x / Protein: x / Nitrite: x   Leuk Esterase: x / RBC: x / WBC x   Sq Epi: x / Non Sq Epi: x / Bacteria: x              EKG:    ECHO, US:    RADIOLOGY:    CRITICAL CARE TIME SPENT:   HPI:  78F w/ hx of NHL s/p RT p/w SOB. Pt endorse 7+ hr drive twice this week. Denies CP, palpitations, fever/chills/sweats.    In ED, pt afebrile and HDS. Labs notable for dimer 8245, troponin . CT PE demonstrated extensive b/l PEs w/ R heart strain. PERT team and ICU were consulted and recommended AC w/ hep gtt. (03 Oct 2023 06:36)  -------------------  Pt was travelling to [place]: drove straight there without stopping and drove back home without stopping to save time.   Reported feeling tired and with bilateral leg cramps and mild dyspnea/SOB. No hemoptysis. No significant SOB.   Hemodynamically stable.   Reports she was treated with radiation therapy for non Hodgkin's lymphoma 2002 but had declined chemo.   Reports recent weight loss (unintentional) approx 6 pounds in the plast year  Has had anemia and was in the process of being worked up for possible lymphoma recurrence.     Started on heparin drip for bilateral PE.        Allergies  No Known Allergies            MEDICATIONS  (STANDING):  heparin  Infusion. 700 Unit(s)/Hr (7 mL/Hr) IV Continuous <Continuous>    MEDICATIONS  (PRN):  acetaminophen     Tablet .. 650 milliGRAM(s) Oral every 6 hours PRN Temp greater or equal to 38C (100.4F), Mild Pain (1 - 3)  aluminum hydroxide/magnesium hydroxide/simethicone Suspension 30 milliLiter(s) Oral every 4 hours PRN Dyspepsia  heparin   Injectable 3800 Unit(s) IV Push every 6 hours PRN For aPTT less than 40  melatonin 3 milliGRAM(s) Oral at bedtime PRN Insomnia  ondansetron Injectable 4 milliGRAM(s) IV Push every 8 hours PRN Nausea and/or Vomiting              PAST MEDICAL & SURGICAL HISTORY:  non Hodgkin's lymphoma  anemia  pre-DM          SOCIAL HISTORY:        REVIEW OF SYSTEMS:  CONSTITUTIONAL: No fever, +weight loss, + fatigue  EYES: No eye pain, visual disturbances, or discharge  ENMT:  No difficulty hearing, tinnitus, vertigo; No sinus or throat pain  NECK: No pain or stiffness  BREASTS: No pain, masses, or nipple discharge  RESPIRATORY: No cough, wheezing, chills or hemoptysis; No shortness of breath  CARDIOVASCULAR: No chest pain, palpitations, no leg swelling  GASTROINTESTINAL: No abdominal or epigastric pain. No nausea, vomiting, or hematemesis; No diarrhea or constipation. No melena or hematochezia.  GENITOURINARY: No dysuria, frequency, hematuria, or incontinence  NEUROLOGICAL: No headaches, memory loss, loss of strength, numbness, or tremors  SKIN: No itching, burning, rashes, or lesions   LYMPH NODES: No enlarged glands  ENDOCRINE: No heat or cold intolerance; No hair loss  MUSCULOSKELETAL: No joint pain or swelling; No muscle/back pain, + bilateral lower leg cramps  PSYCHIATRIC: No depression, anxiety, mood swings, or difficulty sleeping  HEME/LYMPH: No easy bruising, or bleeding gums  ALLERGY AND IMMUNOLOGIC: No hives or eczema          Vital Signs Last 24 Hrs  T(F): 98.2 (10-03-23 @ 11:50), Max: 100.1 (10-02-23 @ 23:35)  HR: 69 (10-03-23 @ 15:54) (68 - 85)  BP: 129/70 (10-03-23 @ 15:54) (119/78 - 150/80)  RR: 18 (10-03-23 @ 15:54) (16 - 21)  SpO2: 98% (10-03-23 @ 15:54) (96% - 100%)          PHYSICAL EXAM:  GENERAL: NAD, well-groomed, well-developed  HEAD:  Atraumatic, Normocephalic  EYES: EOMI, PERRLA, conjunctiva and sclera clear  ENMT: No tonsillar erythema, exudates, or enlargement; Moist mucous membranes, Good dentition, No lesions  NECK: Supple, No JVD, Normal thyroid  NERVOUS SYSTEM:  Alert & Oriented X3, Good concentration; Motor Strength 5/5 B/L upper and lower extremities; DTRs 2+ intact and symmetric  CHEST/LUNG: Clear to percussion bilaterally; No rales, rhonchi, wheezing, or rubs  HEART: Regular rate and rhythm; No murmurs, rubs, or gallops  ABDOMEN: Soft, Nontender, Nondistended; Bowel sounds present  EXTREMITIES:  2+ Peripheral Pulses, No clubbing, cyanosis, or edema  LYMPH: No lymphadenopathy noted  SKIN: No rashes or lesions    LABS:               9.6    7.15  )-----------( 162      ( 03 Oct 2023 05:20 )             28.6     10-03    140  |  110<H>  |  11  ----------------------------<  135<H>  3.7   |  25  |  0.77    Ca    9.4      03 Oct 2023 05:20  Mg     1.8     10-02    TPro  8.9<H>  /  Alb  3.8  /  TBili  0.4  /  DBili  x   /  AST  26  /  ALT  24  /  AlkPhos  75  10-02    PT/INR - ( 02 Oct 2023 12:20 )   PT: 11.0 sec;   INR: 0.92 ratio    PTT - ( 03 Oct 2023 10:45 )  PTT:126.6 sec          US:  < from: US Duplex Venous Lower Ext Complete, Bilateral (10.03.23 @ 13:41) >  No evidence of deep venous thrombosis in either lower extremity.          RADIOLOGY:  < from: Xray Chest 1 View- PORTABLE-Urgent (10.02.23 @ 12:47) >  The evaluation ofthe cardiomediastinal silhouette is limited on portable   technique.    There is prominence of the bronchovascular markings in the left   infrahilar/lower lung zone.  No pleural effusion or pneumothorax noted.  -------------------------------  < from: CT Angio Chest PE Protocol w/ IV Cont (10.02.23 @ 17:28) >  LUNGS AND AIRWAYS: Mild pleural parenchymal scarring in the apices. No   consolidation.  Central airways are clear.  PLEURA: No pleural effusion.  MEDIASTINUM AND TAMI: No mediastinal hemorrhage.  VESSELS: Pulmonary arteries areopacified to the segmental level. There   are extensive acute bilateral pulmonary emboli involving many segmental   branches in all the pulmonary lobes. There are secondary findings of   acute right heart strain.  HEART: Heart size is normal. No pericardial effusion.  CHEST WALL AND LOWER NECK: Within normal limits.  VISUALIZED UPPER ABDOMEN: Within normal limits.  BONES: Degenerative changes in the spine.    IMPRESSION:    1. Extensive acute bilateral pulmonary emboli involving segmental   branches, with secondary findings of acute right heart strain.

## 2023-10-03 NOTE — PATIENT PROFILE ADULT - FALL HARM RISK - HARM RISK INTERVENTIONS

## 2023-10-03 NOTE — PROVIDER CONTACT NOTE (CRITICAL VALUE NOTIFICATION) - BACKGROUND
patient admitted to [facility] for PE
78 year old female PMHx Anemia.  Admitted with SOB after 7 hour car ride from [facility] this AM.

## 2023-10-03 NOTE — H&P ADULT - NSHPPHYSICALEXAM_GEN_ALL_CORE
T(C): 36.9 (10-03-23 @ 02:58), Max: 37.8 (10-02-23 @ 23:35)  HR: 69 (10-03-23 @ 05:18) (68 - 85)  BP: 119/78 (10-03-23 @ 05:18) (119/78 - 150/80)  RR: 17 (10-03-23 @ 05:18) (16 - 20)  SpO2: 98% (10-03-23 @ 05:18) (90% - 99%)    CONSTITUTIONAL: Well groomed, no apparent distress  EYES: PERRLA and symmetric, EOMI, No conjunctival or scleral injection, non-icteric  ENMT: Oral mucosa with moist membranes. Normal dentition; no pharyngeal injection or exudates             NECK: Supple, symmetric and without tracheal deviation   RESP: No respiratory distress, no use of accessory muscles; CTA b/l, no WRR  CV: RRR, +S1S2, no MRG; no JVD; no peripheral edema  GI: Soft, NT, ND, no rebound, no guarding; no palpable masses; no hepatosplenomegaly; no hernia palpated  LYMPH: No cervical LAD or tenderness; no axillary LAD or tenderness; no inguinal LAD or tenderness  MSK: Normal gait; No digital clubbing or cyanosis; examination of the (head/neck/spine/ribs/pelvis, RUE, LUE, RLE, LLE) without misalignment,            Normal ROM without pain, no spinal tenderness, normal muscle strength/tone  SKIN: No rashes or ulcers noted; no subcutaneous nodules or induration palpable  NEURO: CN II-XII intact; normal reflexes in upper and lower extremities, sensation intact in upper and lower extremities b/l to light touch   PSYCH: Appropriate insight/judgment; A+O x 3, mood and affect appropriate, recent/remote memory intact

## 2023-10-03 NOTE — CONSULT NOTE ADULT - ASSESSMENT
78F PMH non Hodgkin's lymphoma 2002 s/p radiation (pt deferred chemo), pre-DM, anemia, pelvic prolapse recent 7+ hr drive to [facility] and back presents for generalized weakness and bilateral leg cramps. Denies CP, palpitations, fever/chills/sweats. Had mild SOB. No hemoptysis. + unintentional weight loss of approximately 6 pounds in past year. In ED vitals stable. D-dimer elevated 8245. CTA chest with extensive bilateral PE with evidence of R heart strain on imaging.     DX: bilateral PE    - hemodynamically stable  - on RA with oxygen saturation > 90%  - no dyspnea with exertion  - no respiratory distress  - no accessory muscle use  - cont on heparin drip for anticoagulation   - monitor PTT  - check 2D echo results as imaging with evidence of RV strain on CT  - lower extremity duplex negative for DVTs  - being worked up outpatient for possibility of lymphoma recurrence  - it is possible this is a provoked PE in the setting of recent long car drive, however with hx of underlying NHL it is possible this is malignancy related  - will follow

## 2023-10-03 NOTE — H&P ADULT - NSHPLABSRESULTS_GEN_ALL_CORE
9.6    7.15  )-----------( 162      ( 03 Oct 2023 05:20 )             28.6     10-03    140  |  110<H>  |  11  ----------------------------<  135<H>  3.7   |  25  |  0.77    Ca    9.4      03 Oct 2023 05:20  Mg     1.8     10-02    TPro  8.9<H>  /  Alb  3.8  /  TBili  0.4  /  DBili  x   /  AST  26  /  ALT  24  /  AlkPhos  75  10-02    PT/INR - ( 02 Oct 2023 12:20 )   PT: 11.0 sec;   INR: 0.92 ratio         PTT - ( 03 Oct 2023 02:10 )  PTT:181.4 sec  Urinalysis Basic - ( 03 Oct 2023 05:20 )    Color: x / Appearance: x / SG: x / pH: x  Gluc: 135 mg/dL / Ketone: x  / Bili: x / Urobili: x   Blood: x / Protein: x / Nitrite: x   Leuk Esterase: x / RBC: x / WBC x   Sq Epi: x / Non Sq Epi: x / Bacteria: x

## 2023-10-03 NOTE — H&P ADULT - HISTORY OF PRESENT ILLNESS
78F w/ hx of NHL s/p RT p/w SOB. Pt endorse 7+ hr drive twice this week. Denies CP, palpitations, fever/chills/sweats.    In ED, pt afebrile and HDS. Labs notable for dimer 8245, troponin . CT PE demonstrated extensive b/l PEs w/ R heart strain. PERT team and ICU were consulted and recommended AC w/ hep gtt.

## 2023-10-04 LAB
ANION GAP SERPL CALC-SCNC: 6 MMOL/L — SIGNIFICANT CHANGE UP (ref 5–17)
APTT BLD: 57.3 SEC — HIGH (ref 24.5–35.6)
APTT BLD: 89.4 SEC — HIGH (ref 24.5–35.6)
APTT BLD: 89.7 SEC — HIGH (ref 24.5–35.6)
BUN SERPL-MCNC: 10 MG/DL — SIGNIFICANT CHANGE UP (ref 7–23)
CALCIUM SERPL-MCNC: 9 MG/DL — SIGNIFICANT CHANGE UP (ref 8.5–10.1)
CHLORIDE SERPL-SCNC: 110 MMOL/L — HIGH (ref 96–108)
CO2 SERPL-SCNC: 27 MMOL/L — SIGNIFICANT CHANGE UP (ref 22–31)
CREAT SERPL-MCNC: 0.71 MG/DL — SIGNIFICANT CHANGE UP (ref 0.5–1.3)
EGFR: 88 ML/MIN/1.73M2 — SIGNIFICANT CHANGE UP
GLUCOSE SERPL-MCNC: 124 MG/DL — HIGH (ref 70–99)
HCT VFR BLD CALC: 29.5 % — LOW (ref 34.5–45)
HCT VFR BLD CALC: 33.2 % — LOW (ref 34.5–45)
HCV AB S/CO SERPL IA: 0.21 S/CO — SIGNIFICANT CHANGE UP (ref 0–0.99)
HCV AB SERPL-IMP: SIGNIFICANT CHANGE UP
HGB BLD-MCNC: 10.9 G/DL — LOW (ref 11.5–15.5)
HGB BLD-MCNC: 9.7 G/DL — LOW (ref 11.5–15.5)
MAGNESIUM SERPL-MCNC: 1.7 MG/DL — SIGNIFICANT CHANGE UP (ref 1.6–2.6)
MCHC RBC-ENTMCNC: 31.3 PG — SIGNIFICANT CHANGE UP (ref 27–34)
MCHC RBC-ENTMCNC: 31.7 PG — SIGNIFICANT CHANGE UP (ref 27–34)
MCHC RBC-ENTMCNC: 32.8 G/DL — SIGNIFICANT CHANGE UP (ref 32–36)
MCHC RBC-ENTMCNC: 32.9 G/DL — SIGNIFICANT CHANGE UP (ref 32–36)
MCV RBC AUTO: 95.2 FL — SIGNIFICANT CHANGE UP (ref 80–100)
MCV RBC AUTO: 96.5 FL — SIGNIFICANT CHANGE UP (ref 80–100)
NRBC # BLD: 0 /100 WBCS — SIGNIFICANT CHANGE UP (ref 0–0)
NRBC # BLD: 0 /100 WBCS — SIGNIFICANT CHANGE UP (ref 0–0)
PHOSPHATE SERPL-MCNC: 3.1 MG/DL — SIGNIFICANT CHANGE UP (ref 2.5–4.5)
PLATELET # BLD AUTO: 154 K/UL — SIGNIFICANT CHANGE UP (ref 150–400)
PLATELET # BLD AUTO: 162 K/UL — SIGNIFICANT CHANGE UP (ref 150–400)
POTASSIUM SERPL-MCNC: 3.8 MMOL/L — SIGNIFICANT CHANGE UP (ref 3.5–5.3)
POTASSIUM SERPL-SCNC: 3.8 MMOL/L — SIGNIFICANT CHANGE UP (ref 3.5–5.3)
RBC # BLD: 3.1 M/UL — LOW (ref 3.8–5.2)
RBC # BLD: 3.44 M/UL — LOW (ref 3.8–5.2)
RBC # FLD: 13.2 % — SIGNIFICANT CHANGE UP (ref 10.3–14.5)
RBC # FLD: 13.3 % — SIGNIFICANT CHANGE UP (ref 10.3–14.5)
SODIUM SERPL-SCNC: 143 MMOL/L — SIGNIFICANT CHANGE UP (ref 135–145)
TROPONIN I, HIGH SENSITIVITY RESULT: 116.5 NG/L — HIGH
WBC # BLD: 5.89 K/UL — SIGNIFICANT CHANGE UP (ref 3.8–10.5)
WBC # BLD: 6.17 K/UL — SIGNIFICANT CHANGE UP (ref 3.8–10.5)
WBC # FLD AUTO: 5.89 K/UL — SIGNIFICANT CHANGE UP (ref 3.8–10.5)
WBC # FLD AUTO: 6.17 K/UL — SIGNIFICANT CHANGE UP (ref 3.8–10.5)

## 2023-10-04 PROCEDURE — 99232 SBSQ HOSP IP/OBS MODERATE 35: CPT

## 2023-10-04 RX ORDER — HEPARIN SODIUM 5000 [USP'U]/ML
3800 INJECTION INTRAVENOUS; SUBCUTANEOUS EVERY 6 HOURS
Refills: 0 | Status: DISCONTINUED | OUTPATIENT
Start: 2023-10-04 | End: 2023-10-05

## 2023-10-04 RX ORDER — HEPARIN SODIUM 5000 [USP'U]/ML
700 INJECTION INTRAVENOUS; SUBCUTANEOUS
Qty: 25000 | Refills: 0 | Status: DISCONTINUED | OUTPATIENT
Start: 2023-10-04 | End: 2023-10-05

## 2023-10-04 RX ADMIN — HEPARIN SODIUM 700 UNIT(S)/HR: 5000 INJECTION INTRAVENOUS; SUBCUTANEOUS at 07:38

## 2023-10-04 RX ADMIN — HEPARIN SODIUM 600 UNIT(S)/HR: 5000 INJECTION INTRAVENOUS; SUBCUTANEOUS at 12:36

## 2023-10-04 RX ADMIN — HEPARIN SODIUM 0 UNIT(S)/HR: 5000 INJECTION INTRAVENOUS; SUBCUTANEOUS at 12:05

## 2023-10-04 RX ADMIN — HEPARIN SODIUM 700 UNIT(S)/HR: 5000 INJECTION INTRAVENOUS; SUBCUTANEOUS at 04:20

## 2023-10-04 RX ADMIN — HEPARIN SODIUM 700 UNIT(S)/HR: 5000 INJECTION INTRAVENOUS; SUBCUTANEOUS at 04:18

## 2023-10-04 RX ADMIN — HEPARIN SODIUM 600 UNIT(S)/HR: 5000 INJECTION INTRAVENOUS; SUBCUTANEOUS at 19:34

## 2023-10-04 RX ADMIN — Medication 650 MILLIGRAM(S): at 23:46

## 2023-10-04 RX ADMIN — HEPARIN SODIUM 600 UNIT(S)/HR: 5000 INJECTION INTRAVENOUS; SUBCUTANEOUS at 19:37

## 2023-10-04 NOTE — PROVIDER CONTACT NOTE (MEDICATION) - SITUATION
PATIENT ON HEPARIN DRIP FOR ACS. ADMITTED WITH PE. TROP WAS ELEVATED ON ADMISSION. PA ONC ALL [facility]. WHITE CALLED TO CLARIFY THE HEPARIN NORMOGRAM ORDER FOR ACS VS FULL ANTICOAGULATION.

## 2023-10-05 ENCOUNTER — TRANSCRIPTION ENCOUNTER (OUTPATIENT)
Age: 78
End: 2023-10-05

## 2023-10-05 VITALS
RESPIRATION RATE: 16 BRPM | TEMPERATURE: 98 F | OXYGEN SATURATION: 99 % | SYSTOLIC BLOOD PRESSURE: 149 MMHG | HEART RATE: 82 BPM

## 2023-10-05 LAB
ANION GAP SERPL CALC-SCNC: 7 MMOL/L — SIGNIFICANT CHANGE UP (ref 5–17)
APTT BLD: 59 SEC — HIGH (ref 24.5–35.6)
BUN SERPL-MCNC: 11 MG/DL — SIGNIFICANT CHANGE UP (ref 7–23)
CALCIUM SERPL-MCNC: 9.5 MG/DL — SIGNIFICANT CHANGE UP (ref 8.5–10.1)
CHLORIDE SERPL-SCNC: 111 MMOL/L — HIGH (ref 96–108)
CO2 SERPL-SCNC: 24 MMOL/L — SIGNIFICANT CHANGE UP (ref 22–31)
CREAT SERPL-MCNC: 0.71 MG/DL — SIGNIFICANT CHANGE UP (ref 0.5–1.3)
EGFR: 88 ML/MIN/1.73M2 — SIGNIFICANT CHANGE UP
GLUCOSE SERPL-MCNC: 116 MG/DL — HIGH (ref 70–99)
HCT VFR BLD CALC: 30.8 % — LOW (ref 34.5–45)
HGB BLD-MCNC: 10.1 G/DL — LOW (ref 11.5–15.5)
MCHC RBC-ENTMCNC: 31.4 PG — SIGNIFICANT CHANGE UP (ref 27–34)
MCHC RBC-ENTMCNC: 32.8 G/DL — SIGNIFICANT CHANGE UP (ref 32–36)
MCV RBC AUTO: 95.7 FL — SIGNIFICANT CHANGE UP (ref 80–100)
NRBC # BLD: 0 /100 WBCS — SIGNIFICANT CHANGE UP (ref 0–0)
PLATELET # BLD AUTO: 170 K/UL — SIGNIFICANT CHANGE UP (ref 150–400)
POTASSIUM SERPL-MCNC: 4 MMOL/L — SIGNIFICANT CHANGE UP (ref 3.5–5.3)
POTASSIUM SERPL-SCNC: 4 MMOL/L — SIGNIFICANT CHANGE UP (ref 3.5–5.3)
RBC # BLD: 3.22 M/UL — LOW (ref 3.8–5.2)
RBC # FLD: 13.2 % — SIGNIFICANT CHANGE UP (ref 10.3–14.5)
SODIUM SERPL-SCNC: 142 MMOL/L — SIGNIFICANT CHANGE UP (ref 135–145)
WBC # BLD: 6.1 K/UL — SIGNIFICANT CHANGE UP (ref 3.8–10.5)
WBC # FLD AUTO: 6.1 K/UL — SIGNIFICANT CHANGE UP (ref 3.8–10.5)

## 2023-10-05 PROCEDURE — 99233 SBSQ HOSP IP/OBS HIGH 50: CPT

## 2023-10-05 PROCEDURE — 99232 SBSQ HOSP IP/OBS MODERATE 35: CPT

## 2023-10-05 RX ORDER — APIXABAN 2.5 MG/1
10 TABLET, FILM COATED ORAL
Refills: 0 | Status: DISCONTINUED | OUTPATIENT
Start: 2023-10-05 | End: 2023-10-05

## 2023-10-05 RX ORDER — APIXABAN 2.5 MG/1
1 TABLET, FILM COATED ORAL
Qty: 60 | Refills: 0
Start: 2023-10-05 | End: 2023-11-03

## 2023-10-05 RX ADMIN — APIXABAN 10 MILLIGRAM(S): 2.5 TABLET, FILM COATED ORAL at 11:25

## 2023-10-05 RX ADMIN — HEPARIN SODIUM 600 UNIT(S)/HR: 5000 INJECTION INTRAVENOUS; SUBCUTANEOUS at 03:15

## 2023-10-05 RX ADMIN — Medication 650 MILLIGRAM(S): at 00:00

## 2023-10-05 RX ADMIN — HEPARIN SODIUM 600 UNIT(S)/HR: 5000 INJECTION INTRAVENOUS; SUBCUTANEOUS at 07:16

## 2023-10-05 NOTE — PHYSICAL THERAPY INITIAL EVALUATION ADULT - ADDITIONAL COMMENTS
As per pt : There are 2 steps, c R rail up, at the entry of the house and 13 steps, c L rail up, to negotiate at home. Pt was a independent community ambulator w/o assist device prior to admission.

## 2023-10-05 NOTE — PROGRESS NOTE ADULT - SUBJECTIVE AND OBJECTIVE BOX
24 hr events:  no acute events  no SOB  on RA  no bleeding events  H/H stable  transitioned off heparin drip to apixaban       ## ROS:  no fever, no chills  no HA, no dizziness  no visual changes, no auditory changes  no sore throat, no sinus congestion  no SOB, no cough  no chest pain, no palpitations  no abdominal pain, no N/V/D  no dysuria, no hematuria  no myalgias, no arthralgias  no swelling  no rashes, no pruritis        ## Labs:  CBC:                        10.1   6.10  )-----------( 170      ( 05 Oct 2023 08:20 )             30.8     Chem:  10-05    142  |  111<H>  |  11  ----------------------------<  116<H>  4.0   |  24  |  0.71    Ca    9.5      05 Oct 2023 08:20  Phos  3.1     10-04  Mg     1.7     10-04      Coags:  PTT - ( 05 Oct 2023 01:25 )  PTT:59.0 sec        ## Imaging:  < from: CT Angio Chest PE Protocol w/ IV Cont (10.02.23 @ 17:28) >  Extensive acute bilateral pulmonary emboli involving segmental   branches, with secondary findings of acute right heart strain.    ------------------------  < from: TTE Echo Complete w/o Contrast w/ Doppler (10.03.23 @ 16:23) >   1. Left ventricular ejection fraction, by visual estimation, is 55 to 60%.   2. Normal global left ventricular systolic function.   3. Normal left ventricular internal cavity size.   4. Spectral Doppler shows impaired relaxation pattern of left ventricular myocardial filling (Grade I diastolic dysfunction).   5. Normal right ventricular size and function.   6. Normal left atrial size.   7. Normal right atrial size.   8. There is no evidence of pericardial effusion.   9. Mild mitral valve regurgitation.  10. Structurally normal mitral valve, with normal leaflet excursion.  11. Moderate tricuspid regurgitation.  12. Mild aortic regurgitation.  13. Estimated pulmonary artery systolic pressure is 41.7 mmHg assuming a   right atrial pressure of 3 mmHg, which is consistent with mild pulmonary   hypertension.          ## Medications:    apixaban 10 milliGRAM(s) Oral <User Schedule>    aluminum hydroxide/magnesium hydroxide/simethicone Suspension 30 milliLiter(s) Oral every 4 hours PRN    acetaminophen     Tablet .. 650 milliGRAM(s) Oral every 6 hours PRN  melatonin 3 milliGRAM(s) Oral at bedtime PRN  ondansetron Injectable 4 milliGRAM(s) IV Push every 8 hours PRN      ## Vitals:  T(C): 36.7 (10-05-23 @ 11:10), Max: 37.2 (10-04-23 @ 23:38)  HR: 76 (10-05-23 @ 11:30) (56 - 78)  BP: 138/80 (10-05-23 @ 11:30) (112/64 - 152/74)  RR: 18 (10-05-23 @ 11:30) (17 - 18)  SpO2: 97% (10-05-23 @ 11:30) (97% - 100%)        10-04 @ 07:01  -  10-05 @ 07:00  --------------------------------------------------------  IN: 72 mL / OUT: 650 mL / NET: -578 mL    10-05 @ 07:01  -  10-05 @ 14:48  --------------------------------------------------------  IN: 400 mL / OUT: 0 mL / NET: 400 mL          ## P/E:  Gen: sitting comfortably in chair, in no apparent distress  HEENT: NC/AT, PERRL, EOMI  Resp: CTA B/L, no wheeze, no rhonchi  CVS: RRR  Abd: soft NT/ND +BS  Ext: no c/c/e  Neuro: A&Ox3     
CHIEF COMPLAINT/INTERVAL HISTORY:    Patient is a 78y old  Female who presents with a chief complaint of pe (03 Oct 2023 17:07)      HPI:  78F w/ hx of NHL s/p RT p/w SOB. Pt endorse 7+ hr drive twice this week. Denies CP, palpitations, fever/chills/sweats.    In ED, pt afebrile and HDS. Labs notable for dimer 8245, troponin . CT PE demonstrated extensive b/l PEs w/ R heart strain. PERT team and ICU were consulted and recommended AC w/ hep gtt. (03 Oct 2023 06:36)      SUBJECTIVE & OBJECTIVE: Pt seen and examined at bedside. sob better, on RA  denies cp/ sob / palpitations  wants to go home asap  denies LE pain      Vital Signs Last 24 Hrs  T(C): 36.4 (04 Oct 2023 11:21), Max: 36.8 (04 Oct 2023 03:50)  T(F): 97.6 (04 Oct 2023 11:21), Max: 98.2 (04 Oct 2023 03:50)  HR: 72 (04 Oct 2023 11:21) (64 - 73)  BP: 122/66 (04 Oct 2023 11:21) (122/66 - 156/73)  BP(mean): --  ABP: --  ABP(mean): --  RR: 16 (04 Oct 2023 11:21) (16 - 20)  SpO2: 98% (04 Oct 2023 11:21) (98% - 99%)    O2 Parameters below as of 04 Oct 2023 03:50  Patient On (Oxygen Delivery Method): room air              MEDICATIONS  (STANDING):  heparin  Infusion. 700 Unit(s)/Hr (7 mL/Hr) IV Continuous <Continuous>    MEDICATIONS  (PRN):  acetaminophen     Tablet .. 650 milliGRAM(s) Oral every 6 hours PRN Temp greater or equal to 38C (100.4F), Mild Pain (1 - 3)  aluminum hydroxide/magnesium hydroxide/simethicone Suspension 30 milliLiter(s) Oral every 4 hours PRN Dyspepsia  heparin   Injectable 3800 Unit(s) IV Push every 6 hours PRN For aPTT less than 40  melatonin 3 milliGRAM(s) Oral at bedtime PRN Insomnia  ondansetron Injectable 4 milliGRAM(s) IV Push every 8 hours PRN Nausea and/or Vomiting        PHYSICAL EXAM:    GENERAL: NAD, malnourished  HEAD:  Atraumatic, Normocephalic  EYES: EOMI, PERRLA, conjunctiva and sclera clear  ENMT: Moist mucous membranes  NECK: Supple  NERVOUS SYSTEM:  Alert & Oriented X3, normal speech  CHEST/LUNG: Clear to auscultation bilaterally; No rales, rhonchi, wheezing, or rubs  HEART: Regular rate and rhythm;  ABDOMEN: Soft, Nontender, bowel sounds present  EXTREMITIES: no cyanosis, or edema    LABS:                        10.9   6.17  )-----------( 162      ( 04 Oct 2023 10:55 )             33.2     10-04    143  |  110<H>  |  10  ----------------------------<  124<H>  3.8   |  27  |  0.71    Ca    9.0      04 Oct 2023 08:40  Phos  3.1     10-04  Mg     1.7     10-04      PTT - ( 04 Oct 2023 10:55 )  PTT:89.7 sec  Urinalysis Basic - ( 04 Oct 2023 08:40 )    Color: x / Appearance: x / SG: x / pH: x  Gluc: 124 mg/dL / Ketone: x  / Bili: x / Urobili: x   Blood: x / Protein: x / Nitrite: x   Leuk Esterase: x / RBC: x / WBC x   Sq Epi: x / Non Sq Epi: x / Bacteria: x    Troponin I, High Sensitivity Result: 116.5: Serial measurements of high sensitivity troponin I (hs TnI) showing rapid  upward or downward changes suggest acute myocardial injury.  Please note  that a sustained elevation of hsTnI can be caused by renal disease,  chronic heart failure, sepsis, pulmonary embolism and other clinical  conditions. ng/L (10.04.23 @ 08:40)    < from: CT Angio Chest PE Protocol w/ IV Cont (10.02.23 @ 17:28) >  FINDINGS:    LUNGS AND AIRWAYS: Mild pleural parenchymal scarring in the apices. No   consolidation.  Central airways are clear.  PLEURA: No pleural effusion.  MEDIASTINUM AND TAMI: No mediastinal hemorrhage.  VESSELS: Pulmonary arteries areopacified to the segmental level. There   are extensive acute bilateral pulmonary emboli involving many segmental   branches in all the pulmonary lobes. There are secondary findings of   acute right heart strain.  HEART: Heart size is normal. No pericardial effusion.  CHEST WALL AND LOWER NECK: Within normal limits.  VISUALIZED UPPER ABDOMEN: Within normal limits.  BONES: Degenerative changes in the spine.    IMPRESSION:    1. Extensive acute bilateral pulmonary emboli involving segmental   branches, with secondary findings of acute right heart strain.    Findings were discussed with TOI MAGALLANES 10/2/2023 6:13 PM by Dr. Kruse with   read back confirmation.    --- End of Report ---            TOMMY KRUSE MD; Attending Radiologist  This document has been electronically signed. Oct  2 2023  6:14PM    < end of copied text >      < from: CT Head No Cont (10.02.23 @ 17:28) >  IMPRESSION:  1. No evidence of acute territorial infarction, hemorrhage or mass effect.  2. Bihemispheric white matter hypoattenuation; anonspecific finding   which statistically reflects chronic microvascular ischemic change.  3. Additional findings as as above, including asymmetric prominence of   the right superior ophthalmic vein, of uncertain clinical significance.    If clinicalsymptoms persist consider further imaging with MRI, provided   there are no medical contraindications.    --- End of Report ---            ZACHARIAH PHAM M.D., ATTENDING RADIOLOGIST  This document has been electronically signed. Oct  2 2023  5:44PM    < end of copied text >  
Patient is a 78y old  Female who presents with a chief complaint of     OVERNIGHT EVENTS:      REVIEW OF SYSTEMS: denies chest pain/SOB, diaphoresis, no F/C, cough, dizziness, headache, blurry vision, nausea, vomiting, abdominal pain. Rest unremarkable     MEDICATIONS  (STANDING):  heparin  Infusion. 700 Unit(s)/Hr (7 mL/Hr) IV Continuous <Continuous>    MEDICATIONS  (PRN):  acetaminophen     Tablet .. 650 milliGRAM(s) Oral every 6 hours PRN Temp greater or equal to 38C (100.4F), Mild Pain (1 - 3)  aluminum hydroxide/magnesium hydroxide/simethicone Suspension 30 milliLiter(s) Oral every 4 hours PRN Dyspepsia  heparin   Injectable 2000 Unit(s) IV Push every 6 hours PRN For aPTT between 40 - 57  heparin   Injectable 4000 Unit(s) IV Push every 6 hours PRN For aPTT less than 40  melatonin 3 milliGRAM(s) Oral at bedtime PRN Insomnia  ondansetron Injectable 4 milliGRAM(s) IV Push every 8 hours PRN Nausea and/or Vomiting      Allergies    No Known Allergies    Intolerances        SUBJECTIVE: in bed in NAD, no acute events overnight     T(F): 98.2 (10-03-23 @ 11:50), Max: 100.1 (10-02-23 @ 23:35)  HR: 69 (10-03-23 @ 15:54) (68 - 85)  BP: 129/70 (10-03-23 @ 15:54) (119/78 - 150/80)  RR: 18 (10-03-23 @ 15:54) (16 - 21)  SpO2: 98% (10-03-23 @ 15:54) (96% - 100%)  Wt(kg): --    PHYSICAL EXAM:  GENERAL: NAD, well-groomed, well-developed  HEAD:  Atraumatic, Normocephalic  EYES: EOMI, PERRLA, conjunctiva and sclera clear  ENMT: No tonsillar erythema, exudates, or enlargement; Moist mucous membranes, Good dentition, No lesions  NECK: Supple, No JVD, Normal thyroid  CHEST/LUNG: Clear to  auscultation bilaterally; No rales, rhonchi, wheezing, or rubs  bilaterally  HEART: Regular rate and rhythm; No murmurs, rubs, or gallops  ABDOMEN: Soft, Nontender, Nondistended; Bowel sounds present  EXTREMITIES:  2+ Peripheral Pulses, No clubbing, cyanosis, or edema BL LE  LYMPH: No lymphadenopathy noted  SKIN: No rashes or lesions  NERVOUS SYSTEM:  Alert & Oriented X3, Good concentration; Motor Strength 5/5 B/L upper and lower extremities;   DTRs 2+ intact and symmetric, sensation intact BL    LABS:                        9.6    7.15  )-----------( 162      ( 03 Oct 2023 05:20 )             28.6     10-03    140  |  110<H>  |  11  ----------------------------<  135<H>  3.7   |  25  |  0.77    Ca    9.4      03 Oct 2023 05:20  Mg     1.8     10-02    TPro  8.9<H>  /  Alb  3.8  /  TBili  0.4  /  DBili  x   /  AST  26  /  ALT  24  /  AlkPhos  75  10-02    PT/INR - ( 02 Oct 2023 12:20 )   PT: 11.0 sec;   INR: 0.92 ratio         PTT - ( 03 Oct 2023 10:45 )  PTT:126.6 sec  Urinalysis Basic - ( 03 Oct 2023 05:20 )    Color: x / Appearance: x / SG: x / pH: x  Gluc: 135 mg/dL / Ketone: x  / Bili: x / Urobili: x   Blood: x / Protein: x / Nitrite: x   Leuk Esterase: x / RBC: x / WBC x   Sq Epi: x / Non Sq Epi: x / Bacteria: x      Cultures;   CAPILLARY BLOOD GLUCOSE        Lipid panel:           RADIOLOGY & ADDITIONAL TESTS:  < from: US Duplex Venous Lower Ext Complete, Bilateral (10.03.23 @ 13:41) >    IMPRESSION:  No evidence of deep venous thrombosis in either lower extremity.        < end of copied text >  < from: CT Head No Cont (10.02.23 @ 17:28) >  IMPRESSION:  1. No evidence of acute territorial infarction, hemorrhage or mass effect.  2. Bihemispheric white matter hypoattenuation; anonspecific finding   which statistically reflects chronic microvascular ischemic change.  3. Additional findings as as above, including asymmetric prominence of   the right superior ophthalmic vein, of uncertain clinical significance.    If clinicalsymptoms persist consider further imaging with MRI, provided   there are no medical contraindications.    < end of copied text >  < from: CT Angio Chest PE Protocol w/ IV Cont (10.02.23 @ 17:28) >    IMPRESSION:    1. Extensive acute bilateral pulmonary emboli involving segmental   branches, with secondary findings of acute right heart strain.    Findings were discussed with TOI MAGALLANES 10/2/2023 6:13 PM by Dr. Kruse with   read back confirmation.    < end of copied text >      Imaging Personally Reviewed:  [ x] YES      Consultant(s) Notes Reviewed:  [x ] YES     Care Discussed with [x ] Consultants [X ] Patient [x ] Family  [x ]    [x ]  Other; RN
24 hr events:  no SOB  no cough  no hemoptysis  no abdominal pain  no melena/hematochezia  on heparin drip  PTT therapeutic  on RA oxygenating well 99%        ## ROS:  no fever, no chills  no HA, no dizziness  no visual changes, no auditory changes  no sore throat, no sinus congestion  no SOB, no cough  no chest pain, no palpitations  no abdominal pain, no N/V/D  no dysuria, no hematuria  no myalgias, no arthralgias  no swelling  no rashes, no pruritis        ## Labs:  CBC:                        10.9   6.17  )-----------( 162      ( 04 Oct 2023 10:55 )             33.2     Chem:  10-04    143  |  110<H>  |  10  ----------------------------<  124<H>  3.8   |  27  |  0.71    Ca    9.0      04 Oct 2023 08:40  Phos  3.1     10-04  Mg     1.7     10-04      Coags:  PTT - ( 04 Oct 2023 10:55 )  PTT:89.7 sec        ## Imaging:  < from: CT Angio Chest PE Protocol w/ IV Cont (10.02.23 @ 17:28) >  LUNGS AND AIRWAYS: Mild pleural parenchymal scarring in the apices. No   consolidation.  Central airways are clear.  PLEURA: No pleural effusion.  MEDIASTINUM AND TAMI: No mediastinal hemorrhage.  VESSELS: Pulmonary arteries areopacified to the segmental level. There   are extensive acute bilateral pulmonary emboli involving many segmental   branches in all the pulmonary lobes. There are secondary findings of   acute right heart strain.  HEART: Heart size is normal. No pericardial effusion.  CHEST WALL AND LOWER NECK: Within normal limits.  VISUALIZED UPPER ABDOMEN: Within normal limits.  BONES: Degenerative changes in the spine.    IMPRESSION:    1. Extensive acute bilateral pulmonary emboli involving segmental   branches, with secondary findings of acute right heart strain.    ----------------------------  < from: Xray Chest 1 View- PORTABLE-Urgent (10.02.23 @ 12:47) >  The evaluation ofthe cardiomediastinal silhouette is limited on portable   technique.    There is prominence of the bronchovascular markings in the left   infrahilar/lower lung zone.  No pleural effusion or pneumothorax noted.    -------------------------------  < from: US Duplex Venous Lower Ext Complete, Bilateral (10.03.23 @ 13:41) >  No evidence of deep venous thrombosis in either lower extremity.            ## Medications:  heparin   Injectable 3800 Unit(s) IV Push every 6 hours PRN  heparin  Infusion. 700 Unit(s)/Hr IV Continuous <Continuous>    aluminum hydroxide/magnesium hydroxide/simethicone Suspension 30 milliLiter(s) Oral every 4 hours PRN    acetaminophen     Tablet .. 650 milliGRAM(s) Oral every 6 hours PRN  melatonin 3 milliGRAM(s) Oral at bedtime PRN  ondansetron Injectable 4 milliGRAM(s) IV Push every 8 hours PRN      ## Vitals:  T(C): 36.7 (10-04-23 @ 15:52), Max: 36.8 (10-04-23 @ 03:50)  HR: 76 (10-04-23 @ 15:52) (64 - 76)  BP: 152/74 (10-04-23 @ 15:52) (122/66 - 156/73)  RR: 17 (10-04-23 @ 15:52) (16 - 20)  SpO2: 99% (10-04-23 @ 15:52) (98% - 99%)         10-03 @ 07:01  -  10-04 @ 07:00  --------------------------------------------------------  IN: 121 mL / OUT: 400 mL / NET: -279 mL          ## P/E:  Gen: sitting comfortably in bed in no apparent distress  HEENT: PERRL, EOMI, sclera white  Resp: CTA B/L, no rales, no rhonchi, no wheeze  CVS: RRR  Abd: soft NT/ND +BS  Ext: no c/c/e  Neuro: A&Ox3

## 2023-10-05 NOTE — DISCHARGE NOTE NURSING/CASE MANAGEMENT/SOCIAL WORK - PATIENT PORTAL LINK FT
You can access the FollowMyHealth Patient Portal offered by [facility] by registering at the following website: [email or web address]. By joining [email or web address] FollowMyHealth portal, you will also be able to view your health information using other applications (apps) compatible with our system.

## 2023-10-05 NOTE — DISCHARGE NOTE PROVIDER - NSDCCPCAREPLAN_GEN_ALL_CORE_FT
PRINCIPAL DISCHARGE DIAGNOSIS  Diagnosis: Pulmonary embolism  Assessment and Plan of Treatment: Continue eliquis for new acute pulmonary embolism.  Please follow up with hematology for further management.     PRINCIPAL DISCHARGE DIAGNOSIS  Diagnosis: Acute pulmonary embolism with acute cor pulmonale  Assessment and Plan of Treatment: Please continue taking your eliquis as prescribed. Please follow up with your outpatient heme/onc and pulmonologist.

## 2023-10-05 NOTE — PROGRESS NOTE ADULT - ASSESSMENT
78F PMH non Hodgkin's lymphoma 2002 s/p radiation (pt deferred chemo), pre-DM, anemia, pelvic prolapse recent 7+ hr drive to [facility] and back presents for generalized weakness and bilateral leg cramps. Denies CP, palpitations, fever/chills/sweats. Had mild SOB. No hemoptysis. + unintentional weight loss of approximately 6 pounds in past year. In ED vitals stable. D-dimer elevated 8245. CTA chest with extensive bilateral PE with evidence of R heart strain on imaging.     DX: bilateral PE    - remains hemodynamically stable  - on RA with oxygen saturation > 95%  - no dyspnea with exertion  - on heparin drip for anticoagulation with therapeutic PTT  - can likely transition to oral anticoagulant for PE  - follow up 2D echo results as imaging with evidence of RV strain  - lower extremity duplex negative for DVTs  - being worked up outpatient for possibility of lymphoma recurrence  - it is possible this is a provoked PE in the setting of recent long car drive, however with hx of underlying NHL it is possible this is malignancy related  - can follow up upon discharge at [address] [place] [address], [address], [place]. Tel [phone]  
78F PMH non Hodgkin's lymphoma 2002 s/p radiation (pt deferred chemo), pre-DM, anemia, pelvic prolapse recent 7+ hr drive to [facility] and back presents for generalized weakness and bilateral leg cramps. Denies CP, palpitations, fever/chills/sweats. Had mild SOB. No hemoptysis. + unintentional weight loss of approximately 6 pounds in past year. In ED vitals stable. D-dimer elevated 8245. CTA chest with extensive bilateral PE with evidence of R heart strain on imaging.     DX: bilateral PE    - remains hemodynamically stable  - on RA with oxygen saturation > 95%  - no dyspnea with exertion  - heparin drip transitioned to apixiban for anticoagulation of PE  - 2D echo reviewed without evidence of RV strain on echo  - lower extremity duplex negative for DVTs  - being worked up outpatient for possibility of lymphoma recurrence, follow up with oncology outpatient  - it is possible this is a provoked PE in the setting of recent long car drive, however with hx of underlying NHL it is possible this is malignancy related  - can follow up upon discharge at [address] [place] [address], [address], [place]. Tel [phone] with pulmonary if she would like  - pt states she will follow up with her PCP and oncologist  - no pulmonary objections for discharge  
78F w/ hx of NHL s/p RT p/w SOB. Found to have b/l PEs    #PE- with right heart strain on CTA  - continue hep gtt  - LE doppler negative   get echo asap   consdier  pulmonary consult      #FEN/PPX  - regular diet  - hep gtt for DVT ppx
77 y/o F with PMH of  NHL s/p RT p/w SOB. Found to have b/l PEs, per pt she recently traveled to [place], and road trip was 6 hr each way .    Acute  PE with RHS on CTA  heparin gtt  Monitor PTT  seen by ICU team on admit as PERT protocol- no need for ICU or any further intervention per intensivist  risk vs benefits of AC d/w pt in detail- agrees with AC  Await ECHO  Pulm eval/ d/w Dr. oGode   will eventually switch to DOAC upon dc.     Recent unintentional weight loss and being worked up for possible  recurrence of NHL with her Oncologist.- keep outpt fu and work up with Onc     elevated trops likely demand ischemia sec to PE- Trops down trending- pt denies CP/ sob- dyspnea improved.     DVT ppx: Heparin drip     d/w pt and consultants

## 2023-10-05 NOTE — DISCHARGE NOTE PROVIDER - NSDCFUSCHEDAPPT_GEN_ALL_CORE_FT
Nga Helms  [facility]  UROGYN [address]  Scheduled Appointment: 11/07/2023    [facility]  UROGY[address]  Scheduled Appointment: 11/09/2023

## 2023-10-05 NOTE — PHYSICAL THERAPY INITIAL EVALUATION ADULT - GENERAL OBSERVATIONS, REHAB EVAL
Pt was seen sitting in bedside chair c cardiac monitor and LUE Heplock intact, alert and Ox4. Family present. Pt was comfortable and motivated. MS of all four extremities is 4/5. Cardiac monitor was removed by Cynthia WESTFALL prior to functional test.

## 2023-10-05 NOTE — DISCHARGE NOTE PROVIDER - HOSPITAL COURSE
79 y/o F with PMH of  NHL s/p RT p/w SOB. Found to have b/l PEs, per pt she recently traveled to [place], and road trip was 6 hr each way .    Acute PE with RHS on CTA  - in the setting of long car ride with hx of malignancy  - heparin gtt initiated   - seen by ICU team on admit as PERT protocol- no need for ICU or any further intervention per intensivist  risk vs benefits of AC d/w pt in detail- agrees with AC  - TTE performed without RHS, EF 55-60, GradeIDD, mod TR, mild AR, mild pHTN  - heparin gtt changed to eliquis for discharge    Troponinemia  - likely in the setting of demand 2/2 PE, downtrending, no CP    Recent unintentional weight loss and being worked up for possible  recurrence of NHL with her Oncologist.- keep outpt fu and work up with Onc     Stable for discharge per attending

## 2023-10-05 NOTE — PHYSICAL THERAPY INITIAL EVALUATION ADULT - PERTINENT HX OF CURRENT PROBLEM, REHAB EVAL
8F w/ hx of NHL s/p RT p/w SOB. Pt endorse 7+ hr drive twice this week. Denies CP, palpitations, fever/chills/sweats.    In ED, pt afebrile and HDS. Labs notable for dimer 8245, troponin . CT PE demonstrated extensive b/l PEs w/ R heart strain.

## 2023-10-05 NOTE — DISCHARGE NOTE PROVIDER - NSDCMRMEDTOKEN_GEN_ALL_CORE_FT
Eliquis Starter Pack for Treatment of DVT and PE 5 mg oral tablet: 1 tab(s) orally 2 times a day Please follow instructions on starter pack.

## 2023-10-05 NOTE — PHYSICAL THERAPY INITIAL EVALUATION ADULT - REFERRING PHYSICIAN, REHAB EVAL
Isabelle Aldana on 10/4 and was discontinued by Lelia LEMOS Sirena E on 10/5/23 11:48 while PT was conducting initial eval.

## 2023-10-05 NOTE — DISCHARGE NOTE PROVIDER - ATTENDING DISCHARGE PHYSICAL EXAMINATION:
Vital Signs Last 24 Hrs  T(F): 98 (05 Oct 2023 11:10), Max: 98.9 (04 Oct 2023 23:38)  HR: 76 (05 Oct 2023 11:30) (56 - 78)  BP: 138/80 (05 Oct 2023 11:30) (112/64 - 152/74)  RR: 18 (05 Oct 2023 11:30) (17 - 18)  SpO2: 97% (05 Oct 2023 11:30) (97% - 100%)    Physical Exam:  Constitutional: NAD, awake and alert  Respiratory: cta b/l no wheezing no rhonchi  Cardiovascular: +s1/s2 no edema b/l le  Gastrointestinal: soft nt nd bs+  Vascular: 2+ peripheral pulses  Neurological: A/O x 3, no focal deficits

## 2023-10-05 NOTE — DISCHARGE NOTE PROVIDER - NSDCCPTREATMENT_GEN_ALL_CORE_FT
PRINCIPAL PROCEDURE  Procedure: CTA chest w/w/o contrast  Findings and Treatment: IMPRESSION:  1. Extensive acute bilateral pulmonary emboli involving segmental   branches, with secondary findings of acute right heart strain.  Findings were discussed with TOI MAGALLANES 10/2/2023 6:13 PM by Dr. Kruse with   read back confirmation.  --- End of Report ---  TOMMY KRUSE MD; Attending Radiologist  This document has been electronically signed. Oct  2 2023  6:14PM

## 2023-10-06 ENCOUNTER — TRANSCRIPTION ENCOUNTER (OUTPATIENT)
Age: 78
End: 2023-10-06

## 2023-10-07 ENCOUNTER — TRANSCRIPTION ENCOUNTER (OUTPATIENT)
Age: 78
End: 2023-10-07

## 2023-10-09 ENCOUNTER — TRANSCRIPTION ENCOUNTER (OUTPATIENT)
Age: 78
End: 2023-10-09

## 2023-10-11 DIAGNOSIS — I26.99 OTHER PULMONARY EMBOLISM WITHOUT ACUTE COR PULMONALE: ICD-10-CM

## 2023-10-11 DIAGNOSIS — R06.02 SHORTNESS OF BREATH: ICD-10-CM

## 2023-10-11 DIAGNOSIS — R73.03 PREDIABETES: ICD-10-CM

## 2023-10-11 DIAGNOSIS — Z85.72 PERSONAL HISTORY OF NON-HODGKIN LYMPHOMAS: ICD-10-CM

## 2023-10-11 DIAGNOSIS — Z92.3 PERSONAL HISTORY OF IRRADIATION: ICD-10-CM

## 2023-10-11 DIAGNOSIS — R79.89 OTHER SPECIFIED ABNORMAL FINDINGS OF BLOOD CHEMISTRY: ICD-10-CM

## 2023-10-11 DIAGNOSIS — I24.89 OTHER FORMS OF ACUTE ISCHEMIC HEART DISEASE: ICD-10-CM

## 2023-10-11 DIAGNOSIS — D64.9 ANEMIA, UNSPECIFIED: ICD-10-CM

## 2023-10-11 DIAGNOSIS — R63.4 ABNORMAL WEIGHT LOSS: ICD-10-CM

## 2023-10-20 ENCOUNTER — TRANSCRIPTION ENCOUNTER (OUTPATIENT)
Age: 78
End: 2023-10-20

## 2023-10-26 ENCOUNTER — TRANSCRIPTION ENCOUNTER (OUTPATIENT)
Age: 78
End: 2023-10-26

## 2023-10-26 PROBLEM — Z78.9 OTHER SPECIFIED HEALTH STATUS: Chronic | Status: ACTIVE | Noted: 2023-10-13

## 2023-10-31 ENCOUNTER — APPOINTMENT (OUTPATIENT)
Dept: PULMONOLOGY | Facility: CLINIC | Age: 78
End: 2023-10-31

## 2023-11-07 ENCOUNTER — APPOINTMENT (OUTPATIENT)
Dept: UROGYNECOLOGY | Facility: CLINIC | Age: 78
End: 2023-11-07
Payer: MEDICARE

## 2023-11-07 VITALS
WEIGHT: 120 LBS | DIASTOLIC BLOOD PRESSURE: 72 MMHG | HEART RATE: 71 BPM | SYSTOLIC BLOOD PRESSURE: 132 MMHG | BODY MASS INDEX: 20.49 KG/M2 | HEIGHT: 64 IN

## 2023-11-07 DIAGNOSIS — R39.15 URGENCY OF URINATION: ICD-10-CM

## 2023-11-07 PROCEDURE — 99214 OFFICE O/P EST MOD 30 MIN: CPT

## 2023-11-07 RX ORDER — VIBEGRON 75 MG/1
75 TABLET, FILM COATED ORAL
Qty: 30 | Refills: 3 | Status: ACTIVE | COMMUNITY
Start: 2023-11-07 | End: 1900-01-01

## 2024-01-02 NOTE — PHYSICAL THERAPY INITIAL EVALUATION ADULT - STANDING BALANCE: DYNAMIC, REHAB EVAL
Head,  normocephalic,  atraumatic,  Face,  Face within normal limits,  Ears,  External ears within normal limits,  Nose/Nasopharynx,  External nose  normal appearance, no nasal discharge,  Mouth and Throat,  Oral cavity appearance normal Lips,  Appearance normal good balance

## 2024-01-22 ENCOUNTER — APPOINTMENT (OUTPATIENT)
Dept: OBGYN | Facility: CLINIC | Age: 79
End: 2024-01-22
Payer: MEDICARE

## 2024-01-22 DIAGNOSIS — Z46.89 ENCOUNTER FOR FITTING AND ADJUSTMENT OF OTHER SPECIFIED DEVICES: ICD-10-CM

## 2024-01-22 DIAGNOSIS — Z86.718 PERSONAL HISTORY OF OTHER VENOUS THROMBOSIS AND EMBOLISM: ICD-10-CM

## 2024-01-22 DIAGNOSIS — Z01.419 ENCOUNTER FOR GYNECOLOGICAL EXAMINATION (GENERAL) (ROUTINE) W/OUT ABNORMAL FINDINGS: ICD-10-CM

## 2024-01-22 DIAGNOSIS — N72 INFLAMMATORY DISEASE OF CERVIX UTERI: ICD-10-CM

## 2024-01-22 DIAGNOSIS — N95.0 POSTMENOPAUSAL BLEEDING: ICD-10-CM

## 2024-01-22 PROCEDURE — 99214 OFFICE O/P EST MOD 30 MIN: CPT | Mod: 25

## 2024-01-22 PROCEDURE — 57150 TREAT VAGINA INFECTION: CPT

## 2024-01-22 PROCEDURE — 81003 URINALYSIS AUTO W/O SCOPE: CPT | Mod: QW

## 2024-01-22 RX ORDER — CLINDAMYCIN PHOSPHATE 100 MG/1
100 SUPPOSITORY VAGINAL
Qty: 7 | Refills: 1 | Status: ACTIVE | COMMUNITY
Start: 2024-01-22 | End: 1900-01-01

## 2024-01-22 RX ORDER — APIXABAN 5 MG/1
5 TABLET, FILM COATED ORAL
Refills: 0 | Status: ACTIVE | COMMUNITY
Start: 2024-01-22

## 2024-01-22 NOTE — PHYSICAL EXAM
[Chaperone Present] : A chaperone was present in the examining room during all aspects of the physical examination [Appropriately responsive] : appropriately responsive [Alert] : alert [No Acute Distress] : no acute distress [No Lymphadenopathy] : no lymphadenopathy [Regular Rate Rhythm] : regular rate rhythm [No Murmurs] : no murmurs [Clear to Auscultation B/L] : clear to auscultation bilaterally [Soft] : soft [Non-tender] : non-tender [Non-distended] : non-distended [No HSM] : No HSM [No Lesions] : no lesions [No Mass] : no mass [Oriented x3] : oriented x3 [Examination Of The Breasts] : a normal appearance [No Masses] : no breast masses were palpable [Labia Majora] : normal [Labia Minora] : normal [Atrophy] : atrophy [Cystocele] : a cystocele [Uterine Prolapse] : uterine prolapse [Scant] : There was scant vaginal bleeding [Erosion] : erosions [Normal] : normal [Normal Position] : in a normal position [Uterine Adnexae] : normal [Tenderness] : nontender [Enlarged ___ wks] : not enlarged [FreeTextEntry5] : inflamed

## 2024-01-22 NOTE — HISTORY OF PRESENT ILLNESS
[Patient reported mammogram was normal] : Patient reported mammogram was normal [Patient reported colonoscopy was normal] : Patient reported colonoscopy was normal [Patient reported PAP Smear was normal] : Patient reported PAP Smear was normal [FreeTextEntry1] : 79YO P1 LMP [address] sees Dr Helms for uterine prolapse with pessary in place s/p vag bleed Sept '23, given clindamycin vaginally with resolution of symptoms, she is seeing some spotting and irritation in her vagina. She was recently placed on Eliquis. Concerned about unintentional weight loss. She does note LAP when she needs to pee. [Mammogramdate] : 2023 [PapSmeardate] : 2022 [TextBox_37] : Osteoporosis [ColonoscopyDate] : 7/23

## 2024-01-23 LAB
BILIRUB UR QL STRIP: NORMAL
CLARITY UR: NORMAL
COLLECTION METHOD: NORMAL
GLUCOSE UR-MCNC: NORMAL
HCG UR QL: 0.2 EU/DL
HGB UR QL STRIP.AUTO: NORMAL
KETONES UR-MCNC: NORMAL
LEUKOCYTE ESTERASE UR QL STRIP: NORMAL
NITRITE UR QL STRIP: NORMAL
PH UR STRIP: 6
PROT UR STRIP-MCNC: NORMAL
SP GR UR STRIP: 1.01

## 2024-01-24 LAB — BACTERIA UR CULT: NORMAL

## 2024-01-26 LAB — CYTOLOGY CVX/VAG DOC THIN PREP: ABNORMAL

## 2024-02-06 ENCOUNTER — APPOINTMENT (OUTPATIENT)
Dept: UROGYNECOLOGY | Facility: CLINIC | Age: 79
End: 2024-02-06
Payer: MEDICARE

## 2024-02-06 VITALS
SYSTOLIC BLOOD PRESSURE: 126 MMHG | DIASTOLIC BLOOD PRESSURE: 72 MMHG | WEIGHT: 120 LBS | HEIGHT: 64 IN | BODY MASS INDEX: 20.49 KG/M2 | HEART RATE: 70 BPM

## 2024-02-06 DIAGNOSIS — N39.41 URGE INCONTINENCE: ICD-10-CM

## 2024-02-06 DIAGNOSIS — N81.4 UTEROVAGINAL PROLAPSE, UNSPECIFIED: ICD-10-CM

## 2024-02-06 DIAGNOSIS — N95.2 POSTMENOPAUSAL ATROPHIC VAGINITIS: ICD-10-CM

## 2024-02-06 PROCEDURE — A4562: CPT

## 2024-02-06 PROCEDURE — 99214 OFFICE O/P EST MOD 30 MIN: CPT | Mod: 25

## 2024-02-08 ENCOUNTER — APPOINTMENT (OUTPATIENT)
Dept: UROGYNECOLOGY | Facility: CLINIC | Age: 79
End: 2024-02-08
Payer: MEDICARE

## 2024-02-08 DIAGNOSIS — N81.11 CYSTOCELE, MIDLINE: ICD-10-CM

## 2024-02-08 PROCEDURE — 99214 OFFICE O/P EST MOD 30 MIN: CPT | Mod: 25

## 2024-02-08 PROCEDURE — A4562: CPT

## 2024-02-08 NOTE — HISTORY OF PRESENT ILLNESS
[FreeTextEntry1] : Laura is a 79-year-old with known midline cystocele managed previously with an open ring #4 pessary.  She was seen this week for pessary refit as she had vaginal bleeding and irritation.  She underwent a TVUS on 2/2/24 that was normal with endometrial lining measuring 0.2 cm in thickness.   She presents today and reports pessary feels like it is going to fall out.

## 2024-02-08 NOTE — PROCEDURE
[Good Fit] : fit is not good [Refit] : refit needed [Erosion] : no evidence of erosion [Erythema] : erythema noted [Discharge] : no vaginal discharge [Infection] : no evidence of infection [Pessary Inserted] : inserted [Mild] : mild bleeding [Resolved w/pressure] : was resolved by applying pressure [Medication Review] : Medicaiton Review: Patient verbalizes understanding of risks and benefits [FreeTextEntry1] : OR#4 [de-identified] : too tight [de-identified] : OR #2 [FreeTextEntry8] : Routine perineal care

## 2024-02-08 NOTE — DISCUSSION/SUMMARY
[FreeTextEntry1] : # Cystocele: -Patient refit with OR #3 -PSC precautions reviewed  # OAB/UUI: - OAB treatment options reviewed.  She just started using the OTC bladder medication.  She wishes to try this before starting any new prescribed medications. -Diet and behavioral modification reviewed  #Vulvovaginal atrophy: -Risks and benefits of transvaginal estrogen cream reviewed.  Patient declines use this time. -Recommended she use Replens or Luvena on in the inside of the vagina 2-3 times a week at night time    All questions answered to the patient's satisfaction.  She expressed understanding. She knows to contact the office with any questions or concerns.

## 2024-02-08 NOTE — PHYSICAL EXAM
[Chaperone Present] : A chaperone was present in the examining room during all aspects of the physical examination [No Acute Distress] : in no acute distress [Oriented x3] : oriented to person, place, and time [Normal Memory] : ~T memory was ~L unimpaired [Normal Mood/Affect] : mood and affect are normal [Anxiety] : patient is not anxious [Normal Gait] : gait was normal [Vulvar Atrophy] : vulvar atrophy [Labia Majora] : were normal [Atrophy] : atrophy [Erythematous] : erythema [Cystocele] : a cystocele [Scant] : there was scant vaginal bleeding [Normal] : no abnormalities

## 2024-02-13 NOTE — END OF VISIT
[FreeTextEntry3] : I have fully participated in the care of this patient. I have reviewed all pertinent clinical information, including history, physical exam, plan and the note and I agree.  Non-scheduled Urgent

## 2024-02-13 NOTE — PHYSICAL EXAM
[Chaperone Present] : A chaperone was present in the examining room during all aspects of the physical examination [No Acute Distress] : in no acute distress [Oriented x3] : oriented to person, place, and time [Normal Memory] : ~T memory was ~L unimpaired [Normal Mood/Affect] : mood and affect are normal [Vulvar Atrophy] : vulvar atrophy [Labia Majora] : were normal [Atrophy] : atrophy [Normal] : no abnormalities [Normal Gait] : gait was normal [Erythematous] : erythema [Cystocele] : a cystocele [Scant] : there was scant vaginal bleeding [Anxiety] : patient is not anxious

## 2024-02-13 NOTE — HISTORY OF PRESENT ILLNESS
[FreeTextEntry1] : Laura is a 79-year-old with known midline cystocele managed with an open ring #4 pessary.  She was recently seen by Dr. 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   TVUS on 2/2/24 that was normal with endometrial lining measuring 0.2 cm in thickness.  Laura presents today for examination and pessary reinsertion.  She is not using any vaginal lubrication.  She denies further vaginal bleeding.  She continues to suffer from bothersome urgency and UUI.  She restarted using an OTC bladder control medication yesterday.   She has a h/o glaucoma and is unable to use anticholinergics.  She never started the prescribed Gemtesa 75mg PO daily.

## 2024-02-13 NOTE — DISCUSSION/SUMMARY
[FreeTextEntry1] : # Cystocele: -Patient refit with OR #2 -PSC precautions reviewed  # OAB/UUI: - OAB treatment options reviewed.  She just started using the OTC bladder medication.  She wishes to try this before starting any new prescribed medications. -Diet and behavioral modification reviewed  #Vulvovaginal atrophy: -Risks and benefits of transvaginal estrogen cream reviewed.  Patient declines use this time. -Recommended she use Replens or Luvena on in the inside of the vagina 2-3 times a week at night time   #Dispo: -RTO in 1-2 weeks or sooner if needed for pessary check  All questions answered to the patient's satisfaction.  She expressed understanding. She knows to contact the office with any questions or concerns.

## 2024-02-13 NOTE — PROCEDURE
[Pessary Inserted] : inserted [Refit] : refit needed [Erythema] : erythema noted [Mild] : mild bleeding [Resolved w/pressure] : was resolved by applying pressure [Medication Review] : Medicaiton Review: Patient verbalizes understanding of risks and benefits [Good Fit] : fit is not good [Erosion] : no evidence of erosion [Discharge] : no vaginal discharge [Infection] : no evidence of infection [FreeTextEntry1] : OR#4 [de-identified] : too tight [de-identified] : OR #2 [FreeTextEntry8] : Routine perineal care

## 2024-04-02 ENCOUNTER — APPOINTMENT (OUTPATIENT)
Dept: UROGYNECOLOGY | Facility: CLINIC | Age: 79
End: 2024-04-02

## 2024-07-15 ENCOUNTER — APPOINTMENT (OUTPATIENT)
Dept: OBGYN | Facility: CLINIC | Age: 79
End: 2024-07-15
Payer: MEDICARE

## 2024-07-15 DIAGNOSIS — N81.10 CYSTOCELE, UNSPECIFIED: ICD-10-CM

## 2024-07-15 PROCEDURE — 99213 OFFICE O/P EST LOW 20 MIN: CPT

## 2024-07-15 PROCEDURE — 99459 PELVIC EXAMINATION: CPT

## 2024-07-16 ENCOUNTER — APPOINTMENT (OUTPATIENT)
Dept: UROGYNECOLOGY | Facility: CLINIC | Age: 79
End: 2024-07-16

## 2024-07-16 LAB
BV BACTERIA RRNA VAG QL NAA+PROBE: NOT DETECTED
C GLABRATA RNA VAG QL NAA+PROBE: NOT DETECTED
CANDIDA RRNA VAG QL PROBE: NOT DETECTED
T VAGINALIS RRNA SPEC QL NAA+PROBE: NOT DETECTED

## 2024-07-19 LAB — BACTERIA GENITAL AEROBE CULT: NORMAL

## 2024-08-14 NOTE — ED ADULT NURSE NOTE - ED CARDIAC RHYTHM
Pt calling w [facility]  - new patient to [facility] system; coming from [facility] area  Inquiring on TOP  Patient states she is 21-22wks and allowed RN to search Care Everywhere for her current OB records.  RN reviewed records and last US on file was from 4/23/24 - states she is 14-15 wks at that time. She would be around 30 wks today based on this US.    Confirmed twice through  if this is the same pregnancy. Yes, same pregnancy. Pt admits yes, she is further along after the second confirmation question.  Pt does not know what to do as her parents are unaware of her pregnancy and wants to terminate as she does not want the baby and doesn't know what to say to her parents.    RN suggested [facility] and number given for [facility] location. RN is unaware of any location that does later gestational abortions but [facility] for more info.   ALSO suggested pt return to her primary OB clinic/doctor and explain her situation and they can help w supportive resources for her to continue and help w support of the family situation.    Pt verbalized understanding, in agreement with plan, and voiced no further questions.    Jana Walker RN on 8/14/2024 at 11:29 AM        
regular

## 2025-01-03 ENCOUNTER — APPOINTMENT (OUTPATIENT)
Dept: ORTHOPEDIC SURGERY | Facility: CLINIC | Age: 80
End: 2025-01-03
Payer: MEDICARE

## 2025-01-03 DIAGNOSIS — M75.51 BURSITIS OF RIGHT SHOULDER: ICD-10-CM

## 2025-01-03 DIAGNOSIS — M75.81 OTHER SHOULDER LESIONS, RIGHT SHOULDER: ICD-10-CM

## 2025-01-03 DIAGNOSIS — E11.9 TYPE 2 DIABETES MELLITUS W/OUT COMPLICATIONS: ICD-10-CM

## 2025-01-03 DIAGNOSIS — M54.12 RADICULOPATHY, CERVICAL REGION: ICD-10-CM

## 2025-01-03 PROCEDURE — 99204 OFFICE O/P NEW MOD 45 MIN: CPT | Mod: 25

## 2025-01-03 PROCEDURE — 72040 X-RAY EXAM NECK SPINE 2-3 VW: CPT

## 2025-01-03 PROCEDURE — 73010 X-RAY EXAM OF SHOULDER BLADE: CPT | Mod: RT

## 2025-01-03 PROCEDURE — 73030 X-RAY EXAM OF SHOULDER: CPT | Mod: RT

## 2025-01-03 RX ORDER — MELOXICAM 7.5 MG/1
7.5 TABLET ORAL
Qty: 30 | Refills: 0 | Status: ACTIVE | COMMUNITY
Start: 2025-01-03 | End: 1900-01-01

## 2025-02-14 ENCOUNTER — APPOINTMENT (OUTPATIENT)
Dept: ORTHOPEDIC SURGERY | Facility: CLINIC | Age: 80
End: 2025-02-14

## 2025-09-03 ENCOUNTER — APPOINTMENT (OUTPATIENT)
Dept: OBGYN | Facility: CLINIC | Age: 80
End: 2025-09-03
Payer: MEDICARE

## 2025-09-03 DIAGNOSIS — R35.0 FREQUENCY OF MICTURITION: ICD-10-CM

## 2025-09-03 DIAGNOSIS — N95.2 POSTMENOPAUSAL ATROPHIC VAGINITIS: ICD-10-CM

## 2025-09-03 DIAGNOSIS — Z46.89 ENCOUNTER FOR FITTING AND ADJUSTMENT OF OTHER SPECIFIED DEVICES: ICD-10-CM

## 2025-09-03 DIAGNOSIS — N81.10 CYSTOCELE, UNSPECIFIED: ICD-10-CM

## 2025-09-03 LAB
BILIRUB UR QL STRIP: NORMAL
GLUCOSE UR-MCNC: NORMAL
HCG UR QL: 0.2 EU/DL
HGB UR QL STRIP.AUTO: NORMAL
KETONES UR-MCNC: NORMAL
LEUKOCYTE ESTERASE UR QL STRIP: NORMAL
NITRITE UR QL STRIP: NORMAL
PH UR STRIP: 7
PROT UR STRIP-MCNC: 30
SP GR UR STRIP: 1.02

## 2025-09-03 PROCEDURE — 99213 OFFICE O/P EST LOW 20 MIN: CPT | Mod: 25

## 2025-09-03 PROCEDURE — 81003 URINALYSIS AUTO W/O SCOPE: CPT | Mod: QW

## 2025-09-05 LAB — BACTERIA UR CULT: NORMAL
